# Patient Record
Sex: FEMALE | Race: WHITE | Employment: FULL TIME | URBAN - METROPOLITAN AREA
[De-identification: names, ages, dates, MRNs, and addresses within clinical notes are randomized per-mention and may not be internally consistent; named-entity substitution may affect disease eponyms.]

---

## 2019-06-01 ENCOUNTER — HOSPITAL ENCOUNTER (OUTPATIENT)
Age: 49
Discharge: HOME | End: 2019-06-01
Payer: COMMERCIAL

## 2019-06-01 PROCEDURE — 80061 LIPID PANEL: CPT

## 2019-06-01 PROCEDURE — 84443 ASSAY THYROID STIM HORMONE: CPT

## 2019-06-01 PROCEDURE — 80307 DRUG TEST PRSMV CHEM ANLYZR: CPT

## 2019-06-01 PROCEDURE — 80053 COMPREHEN METABOLIC PANEL: CPT

## 2019-06-01 PROCEDURE — 36415 COLL VENOUS BLD VENIPUNCTURE: CPT

## 2019-06-01 PROCEDURE — 85025 COMPLETE CBC W/AUTO DIFF WBC: CPT

## 2019-06-02 LAB
A/G RATIO: 1.7 (ref 1.1–2.2)
ALBUMIN SERPL-MCNC: 4.9 G/DL (ref 3.4–5)
ALP BLD-CCNC: 106 U/L (ref 40–129)
ALT SERPL-CCNC: 36 U/L (ref 10–40)
AMPHETAMINE SCREEN, URINE: NORMAL
ANION GAP SERPL CALCULATED.3IONS-SCNC: 13 MMOL/L (ref 3–16)
AST SERPL-CCNC: 30 U/L (ref 15–37)
BARBITURATE SCREEN URINE: NORMAL
BASOPHILS ABSOLUTE: 0.1 K/UL (ref 0–0.2)
BASOPHILS RELATIVE PERCENT: 1 %
BENZODIAZEPINE SCREEN, URINE: NORMAL
BILIRUB SERPL-MCNC: 0.5 MG/DL (ref 0–1)
BUN BLDV-MCNC: 12 MG/DL (ref 7–20)
CALCIUM SERPL-MCNC: 9.8 MG/DL (ref 8.3–10.6)
CANNABINOID SCREEN URINE: NORMAL
CHLORIDE BLD-SCNC: 104 MMOL/L (ref 99–110)
CHOLESTEROL, TOTAL: 206 MG/DL (ref 0–199)
CO2: 25 MMOL/L (ref 21–32)
COCAINE METABOLITE SCREEN URINE: NORMAL
CREAT SERPL-MCNC: 0.6 MG/DL (ref 0.6–1.1)
EOSINOPHILS ABSOLUTE: 0.1 K/UL (ref 0–0.6)
EOSINOPHILS RELATIVE PERCENT: 1.9 %
GFR AFRICAN AMERICAN: >60
GFR NON-AFRICAN AMERICAN: >60
GLOBULIN: 2.9 G/DL
GLUCOSE BLD-MCNC: 98 MG/DL (ref 70–99)
HCT VFR BLD CALC: 46.4 % (ref 36–48)
HDLC SERPL-MCNC: 122 MG/DL (ref 40–60)
HEMOGLOBIN: 16 G/DL (ref 12–16)
LDL CHOLESTEROL CALCULATED: 76 MG/DL
LYMPHOCYTES ABSOLUTE: 1.3 K/UL (ref 1–5.1)
LYMPHOCYTES RELATIVE PERCENT: 17.1 %
Lab: NORMAL
MCH RBC QN AUTO: 34.1 PG (ref 26–34)
MCHC RBC AUTO-ENTMCNC: 34.5 G/DL (ref 31–36)
MCV RBC AUTO: 99 FL (ref 80–100)
METHADONE SCREEN, URINE: NORMAL
MONOCYTES ABSOLUTE: 0.6 K/UL (ref 0–1.3)
MONOCYTES RELATIVE PERCENT: 7.6 %
NEUTROPHILS ABSOLUTE: 5.5 K/UL (ref 1.7–7.7)
NEUTROPHILS RELATIVE PERCENT: 72.4 %
OPIATE SCREEN URINE: NORMAL
OXYCODONE URINE: NORMAL
PDW BLD-RTO: 13.7 % (ref 12.4–15.4)
PH UA: 5
PHENCYCLIDINE SCREEN URINE: NORMAL
PLATELET # BLD: 264 K/UL (ref 135–450)
PMV BLD AUTO: 9.3 FL (ref 5–10.5)
POTASSIUM SERPL-SCNC: 5.6 MMOL/L (ref 3.5–5.1)
PROPOXYPHENE SCREEN: NORMAL
RBC # BLD: 4.68 M/UL (ref 4–5.2)
SODIUM BLD-SCNC: 142 MMOL/L (ref 136–145)
TOTAL PROTEIN: 7.8 G/DL (ref 6.4–8.2)
TRIGL SERPL-MCNC: 41 MG/DL (ref 0–150)
TSH REFLEX FT4: 2.49 UIU/ML (ref 0.27–4.2)
VLDLC SERPL CALC-MCNC: 8 MG/DL
WBC # BLD: 7.6 K/UL (ref 4–11)

## 2020-07-02 ENCOUNTER — TELEPHONE (OUTPATIENT)
Dept: ADMINISTRATIVE | Age: 50
End: 2020-07-02

## 2020-07-07 ENCOUNTER — NURSE TRIAGE (OUTPATIENT)
Dept: OTHER | Facility: CLINIC | Age: 50
End: 2020-07-07

## 2020-07-07 ENCOUNTER — TELEPHONE (OUTPATIENT)
Dept: ADMINISTRATIVE | Age: 50
End: 2020-07-07

## 2020-07-07 NOTE — TELEPHONE ENCOUNTER
Reason for Disposition   Numbness or tingling in one or both hands is a chronic symptom (recurrent or ongoing problem lasting > 4 weeks)    Answer Assessment - Initial Assessment Questions  1. SYMPTOM: \"What is the main symptom you are concerned about? \" (e.g., weakness, numbness)      Numbness and tingling in arms and feet  2. ONSET: \"When did this start? \" (minutes, hours, days; while sleeping)      2-3 weeks  3. LAST NORMAL: \"When was the last time you were normal (no symptoms)? \"      2-3 weeks ago  4. PATTERN \"Does this come and go, or has it been constant since it started? \"  \"Is it present now? \"      constant  5. CARDIAC SYMPTOMS: \"Have you had any of the following symptoms: chest pain, difficulty breathing, palpitations? \"      no  6. NEUROLOGIC SYMPTOMS: \"Have you had any of the following symptoms: headache, dizziness, vision loss, double vision, changes in speech, unsteady on your feet? \"      no  7. OTHER SYMPTOMS: \"Do you have any other symptoms? \"      no  8. PREGNANCY: \"Is there any chance you are pregnant? \" \"When was your last menstrual period? \"      no    Protocols used: NEUROLOGIC DEFICIT-ADULT-[place]

## 2020-07-13 ENCOUNTER — TELEPHONE (OUTPATIENT)
Dept: INTERNAL MEDICINE CLINIC | Age: 50
End: 2020-07-13

## 2020-07-13 ENCOUNTER — OFFICE VISIT (OUTPATIENT)
Dept: INTERNAL MEDICINE CLINIC | Age: 50
End: 2020-07-13
Payer: COMMERCIAL

## 2020-07-13 VITALS
DIASTOLIC BLOOD PRESSURE: 72 MMHG | BODY MASS INDEX: 26.16 KG/M2 | TEMPERATURE: 98.1 F | HEART RATE: 101 BPM | WEIGHT: 157 LBS | HEIGHT: 65 IN | OXYGEN SATURATION: 98 % | SYSTOLIC BLOOD PRESSURE: 130 MMHG

## 2020-07-13 PROBLEM — J43.8 OTHER EMPHYSEMA (HCC): Status: ACTIVE | Noted: 2020-07-13

## 2020-07-13 PROBLEM — F31.61 BIPOLAR 1 DISORDER, MIXED, MILD (HCC): Status: ACTIVE | Noted: 2020-07-13

## 2020-07-13 PROBLEM — Z79.899 CONTROLLED SUBSTANCE AGREEMENT SIGNED: Status: ACTIVE | Noted: 2020-07-13

## 2020-07-13 PROBLEM — F98.8 ATTENTION DEFICIT DISORDER (ADD) WITHOUT HYPERACTIVITY: Status: ACTIVE | Noted: 2020-07-13

## 2020-07-13 PROCEDURE — 99204 OFFICE O/P NEW MOD 45 MIN: CPT | Performed by: INTERNAL MEDICINE

## 2020-07-13 PROCEDURE — G8926 SPIRO NO PERF OR DOC: HCPCS | Performed by: INTERNAL MEDICINE

## 2020-07-13 PROCEDURE — G8427 DOCREV CUR MEDS BY ELIG CLIN: HCPCS | Performed by: INTERNAL MEDICINE

## 2020-07-13 PROCEDURE — 90715 TDAP VACCINE 7 YRS/> IM: CPT | Performed by: INTERNAL MEDICINE

## 2020-07-13 PROCEDURE — 90471 IMMUNIZATION ADMIN: CPT | Performed by: INTERNAL MEDICINE

## 2020-07-13 PROCEDURE — G8419 CALC BMI OUT NRM PARAM NOF/U: HCPCS | Performed by: INTERNAL MEDICINE

## 2020-07-13 PROCEDURE — 99386 PREV VISIT NEW AGE 40-64: CPT | Performed by: INTERNAL MEDICINE

## 2020-07-13 PROCEDURE — 3023F SPIROM DOC REV: CPT | Performed by: INTERNAL MEDICINE

## 2020-07-13 PROCEDURE — 4004F PT TOBACCO SCREEN RCVD TLK: CPT | Performed by: INTERNAL MEDICINE

## 2020-07-13 RX ORDER — GABAPENTIN 300 MG/1
300 CAPSULE ORAL 3 TIMES DAILY
Qty: 90 CAPSULE | Refills: 0 | Status: SHIPPED | OUTPATIENT
Start: 2020-07-13 | End: 2020-08-11 | Stop reason: SDUPTHER

## 2020-07-13 RX ORDER — BUDESONIDE AND FORMOTEROL FUMARATE DIHYDRATE 80; 4.5 UG/1; UG/1
2 AEROSOL RESPIRATORY (INHALATION) DAILY
COMMUNITY
End: 2020-07-13 | Stop reason: DRUGHIGH

## 2020-07-13 RX ORDER — DEXTROAMPHETAMINE SACCHARATE, AMPHETAMINE ASPARTATE, DEXTROAMPHETAMINE SULFATE AND AMPHETAMINE SULFATE 3.75; 3.75; 3.75; 3.75 MG/1; MG/1; MG/1; MG/1
15 TABLET ORAL DAILY PRN
Qty: 30 TABLET | Refills: 0 | Status: SHIPPED | OUTPATIENT
Start: 2020-07-13 | End: 2020-07-13 | Stop reason: SDUPTHER

## 2020-07-13 RX ORDER — DEXTROAMPHETAMINE SACCHARATE, AMPHETAMINE ASPARTATE, DEXTROAMPHETAMINE SULFATE AND AMPHETAMINE SULFATE 3.75; 3.75; 3.75; 3.75 MG/1; MG/1; MG/1; MG/1
15 TABLET ORAL DAILY PRN
Qty: 30 TABLET | Refills: 0 | Status: SHIPPED | OUTPATIENT
Start: 2020-07-13 | End: 2020-08-11 | Stop reason: SDUPTHER

## 2020-07-13 RX ORDER — CITALOPRAM 40 MG/1
40 TABLET ORAL DAILY
Qty: 90 TABLET | Refills: 0 | COMMUNITY
Start: 2020-07-13 | End: 2020-09-29 | Stop reason: SDUPTHER

## 2020-07-13 RX ORDER — BUDESONIDE AND FORMOTEROL FUMARATE DIHYDRATE 160; 4.5 UG/1; UG/1
2 AEROSOL RESPIRATORY (INHALATION) 2 TIMES DAILY
Qty: 1 INHALER | Refills: 0 | Status: SHIPPED | OUTPATIENT
Start: 2020-07-13 | End: 2020-08-11 | Stop reason: SDUPTHER

## 2020-07-13 RX ORDER — DEXTROAMPHETAMINE SACCHARATE, AMPHETAMINE ASPARTATE, DEXTROAMPHETAMINE SULFATE AND AMPHETAMINE SULFATE 3.75; 3.75; 3.75; 3.75 MG/1; MG/1; MG/1; MG/1
15 TABLET ORAL 2 TIMES DAILY
COMMUNITY
End: 2020-07-13 | Stop reason: SDUPTHER

## 2020-07-13 ASSESSMENT — PATIENT HEALTH QUESTIONNAIRE - PHQ9
SUM OF ALL RESPONSES TO PHQ QUESTIONS 1-9: 2
2. FEELING DOWN, DEPRESSED OR HOPELESS: 1
1. LITTLE INTEREST OR PLEASURE IN DOING THINGS: 1
SUM OF ALL RESPONSES TO PHQ QUESTIONS 1-9: 2
SUM OF ALL RESPONSES TO PHQ9 QUESTIONS 1 & 2: 2

## 2020-07-13 NOTE — LETTER
MEDICATION AGREEMENT     [facility]  4/5/3706      For certain conditions, multiple classes of medications may be used to help better manage your symptoms, and to improve your ability to function at home, work and in social settings. However, these medications do have risks, which will be discussed with you, including addiction and dependency. The following prescribed medications need frequent monitoring and will require you to partner and assist in your healthcare. Medication  Dose, instructions and quantity as indicated on current prescription bottle Diagnosis/Reason(s) for Taking Category     Adderall 15 mg bid prn ADD                            Benefits and goals of Controlled Substance Medications: There are two potential goals for your treatment: (1) decreased pain and suffering (2) improved daily life functions. There are many possible treatments for your chronic condition(s), and, in addition to controlled substance medications, we will try alternatives such as physical therapy, yoga, massage, home daily exercise, meditation, relaxation techniques, injections, chiropractic manipulations, surgery, cognitive therapy, hypnosis and many medications that are not habit-forming. Use of controlled substance medications may be helpful, but they are unlikely to resolve all of your symptoms or restore all function. Risks of Controlled Substance Medications:    Opioid pain medications: These medications can lead to problems such as addiction/dependence, sedation, lightheadedness/dizziness, memory issues, falls, constipation, nausea, or vomiting. They may also impair the ability to drive or operate machinery. Additionally, these medications may lower testosterone levels, leading to loss of bone strength, stamina and sex drive. They may cause problems with breathing, sleep apnea and reduced coughing, which are especially dangerous for patients with lung disease.   Overdose or dangerous interactions with alcohol and other medications may occur, leading to death. Hyperalgesia may develop, in which patients receiving opioids for the treatment of pain may actually become more sensitive to certain painful stimuli, and in some cases, experience pain from ordinarily non-painful stimuli. Women between the ages of 14-53 who could become pregnant should carefully weigh the risks and benefits of opioids with their physicians, as these medications increase the risk of pregnancy complications, including miscarriage,  delivery and stillbirth. It is also possible for babies to be born addicted to opioids. Opioid dependence withdrawal symptoms may include; feelings of uneasiness, increased pain, irritability, belly pain, diarrhea, sweats and goose-flesh. Benzodiazepines and non-benzodiazepine sleep medications: These medications can lead to problems such as addiction/dependence, sedation, fatigue, lightheadedness, dizziness, incoordination, falls, depression, hallucinations, and impaired judgment, memory and concentration. The ability to drive and operate machinery may also be affected. Abnormal sleep-related behaviors have been reported, including sleep walking, driving, making telephone calls, eating, or having sex while not fully awake. These medications can suppress breathing and worsen sleep apnea, particularly when combined with alcohol or other sedating medications, potentially leading to death. Dependence withdrawal symptoms may include tremors, anxiety, hallucinations and seizures. Stimulants:  Common adverse effects include addiction/dependence, increased blood pressure and heart rate, decreased appetite, nausea, involuntary weight loss, insomnia, irritability, and headaches. These risks may increase when these medications are combined with other stimulants, such as caffeine pills or energy drinks, certain weight loss supplements and oral decongestants.   Dependence withdrawal symptoms may include depressed mood, loss of interest, suicidal thoughts, anxiety, fatigue, appetite changes and agitation. Testosterone replacement therapy:  Potential side effects include increased risk of stroke and heart attack, blood clots, increased blood pressure, increased cholesterol, enlarged prostate, sleep apnea, irritability/aggression and other mood disorders, and decreased fertility. Other:     1. I understand that I have the following responsibilities:  · I will take medications at the dose and frequency prescribed. · I will not increase or change how I take my medications without the approval of the health care provider who signs this Medication Agreement. · I will arrange for refills at the prescribed interval ONLY during regular office hours. I will not ask for refills earlier than agreed, after-hours, on holidays or on weekends. · I will obtain all refills for these medications at  ·  ____________________________________  pharmacy (phone number  ·  ________________________), with full consent for my provider and pharmacist to exchange information in writing or verbally. · I will not request any pain medications or controlled substances from other providers and will inform this provider of all other medications I am taking. · I will inform my other health care providers that I am taking these medications and of the existence of this [id number]. In the event of an emergency, I will provide the same information to the emergency department providers. · I will protect my prescriptions and medications. I understand that lost or misplaced prescriptions will not be replaced. · I will keep medications only for my own use and will not share them with others. I will keep all medications away from children. · I agree to participate in any medical, psychological or psychiatric assessments recommended by my provider.   · I will actively participate in any program designed to improve

## 2020-07-13 NOTE — TELEPHONE ENCOUNTER
Patient called in & was informed by Jim that they are not in network with [facility], all of her prescriptions were transferred to [facility] except Adderall. We will need a new prescription sent to the [address]. I will call & cancel the one sent to [place].

## 2020-07-13 NOTE — PROGRESS NOTES
[facility]  History and Physical  Kavon Hernandez M.D. Nicole Garcia  YOB: 1970    Date of Service:  7/13/2020    Chief Complaint:   Nicole Garcia is a 52 y.o. female who presents for   Chief Complaint   Patient presents with   [facility]       HPI: Here for Annual Physical and Follow up. She complains of numbness in her feet 2 months ago and then bilateral hand numbness. She lost her job 2 months ago and now babysitting 2 grandkids 2 and 3 y/o and she's on her feet a lot chasing them around. Her grandkids live with her every other week and stay with their dad the other time. ADD:  She's been out of her Adderal 15 mg bid since July 4 th and a little scattered brain but doing ok since just watching her grandkids and no longer working. Bipolar with depression due to stress of taking care of her grandkids and her daughter stealing from her for drugs. She's been on Vraylar 1.5 mg qd. She was seeing a counselor until she lost her job and insurance.     Emphysema:  Have cut back on TOB to 1/2 ppd recently and doing ok on Symbicort 80 bid    No results found for: LABA1C, LABMICR  Lab Results   Component Value Date     06/01/2019    K 5.6 (H) 06/01/2019     06/01/2019    CO2 25 06/01/2019    BUN 12 06/01/2019    CREATININE 0.6 06/01/2019    GLUCOSE 98 06/01/2019    CALCIUM 9.8 06/01/2019     Lab Results   Component Value Date    CHOL 206 06/01/2019    TRIG 41 06/01/2019     06/01/2019    LDLCALC 76 06/01/2019     Lab Results   Component Value Date    ALT 36 06/01/2019    AST 30 06/01/2019     No results found for: TSH, T4FREE  Lab Results   Component Value Date    WBC 7.6 06/01/2019    HGB 16.0 06/01/2019    HCT 46.4 06/01/2019    MCV 99.0 06/01/2019     06/01/2019     No results found for: INR   No results found for: PSA   No results found for: LABURIC     Wt Readings from Last 3 Encounters:   07/13/20 157 lb (71.2 kg)     BP Readings from Last 3 Encounters:   07/13/20 130/72       There is no problem list on file for this patient. No Known Allergies  Outpatient Medications Marked as Taking for the 7/13/20 encounter (Office Visit) with Sandra Jarrett MD   Medication Sig Dispense Refill    cariprazine hcl (VRAYLAR) 1.5 MG capsule Take 1.5 mg by mouth daily      amphetamine-dextroamphetamine (ADDERALL) 15 MG tablet Take 15 mg by mouth 2 times daily.  budesonide-formoterol (SYMBICORT) 80-4.5 MCG/ACT AERO Inhale 2 puffs into the lungs daily         Past Medical History:   Diagnosis Date    ADHD (attention deficit hyperactivity disorder)     Anxiety     Bipolar disorder (HCC)     COPD (chronic obstructive pulmonary disease) ([facility] [address]     Depression      History reviewed. No pertinent surgical history.   Family History   Problem Relation Age of Onset    Breast Cancer Mother     High Blood Pressure Mother    [facility] Cancer Father         esophageal    ADHD Sister     Bipolar Disorder Brother     Mental Illness Brother     Heart Disease Maternal Grandmother     Heart Attack Maternal Grandmother 48    Cancer Maternal Grandfather         throat    Alcohol Abuse Maternal Grandfather     Prostate Cancer Maternal Uncle      Social History     Socioeconomic History    Marital status: Single     Spouse name: Not on file    Number of children: Not on file    Years of education: Not on file    Highest education level: Not on file   Occupational History    Not on file   Social Needs    Financial resource strain: Not on file    Food insecurity     Worry: Not on file     Inability: Not on file   [employer] needs     Medical: Not on file     Non-medical: Not on file   Tobacco Use    Smoking status: Current Every Day Smoker     Packs/day: 1.00     Years: 30.00     Pack years: 30.00     Types: Cigarettes    Smokeless tobacco: Never Used   Substance and Sexual Activity    Alcohol use: Yes     Comment: Occasionally    Drug use: Never    Sexual activity: Not Currently   Lifestyle    Physical activity     Days per week: Not on file     Minutes per session: Not on file    Stress: Not on file   Relationships    Social connections     Talks on phone: Not on file     Gets together: Not on file     Attends [employer] service: Not on file     Active member of club or organization: Not on file     Attends meetings of clubs or organizations: Not on file     Relationship status: Not on file    Intimate partner violence     Fear of current or ex partner: Not on file     Emotionally abused: Not on file     Physically abused: Not on file     Forced sexual activity: Not on file   Other Topics Concern    Not on file   Social History Narrative    Not on file       Review of Systems:  A comprehensive review of systems was negative except for what was noted in the HPI. Physical Exam:   Vitals:    07/13/20 1124   BP: 130/72   Pulse: 101   Temp: 98.1 °F (36.7 °C)   TempSrc: Infrared   SpO2: 98%   Weight: 157 lb (71.2 kg)   Height: 5' 5\" (1.651 m)     Body mass index is 26.13 kg/m². Constitutional: She is oriented to person, place, and time. She appears well-developed and well-nourished. No distress. HEENT:   Head: Normocephalic and atraumatic. Right Ear: Tympanic membrane, external ear and ear canal normal.   Left Ear: Tympanic membrane, external ear and ear canal normal.   Mouth/Throat: Oropharynx is clear and moist, and mucous membranes are normal.  There is no cervical adenopathy. Eyes: Conjunctivae and extraocular motions are normal. Pupils are equal, round, and reactive to light. Neck: Supple. No JVD present. Carotid bruit is not present. No mass and no thyromegaly present. Cardiovascular: Normal rate, regular rhythm, normal heart sounds and intact distal pulses. Exam reveals no gallop and no friction rub. No murmur heard. Pulmonary/Chest: Effort normal and breath sounds normal. No respiratory distress. She has no wheezes, rhonchi or rales. Abdominal: Soft, non-tender. Bowel sounds and aorta are normal. She exhibits no organomegaly, mass or bruit. Genitourinary: performed by gynecologist   Breast exam:  performed by specialist.  Musculoskeletal: Normal range of motion, no synovitis. She exhibits no edema. Neurological: She is alert and oriented to person, place, and time. She has normal reflexes. No cranial nerve deficit. Coordination normal.   Skin: Skin is warm and dry. There is no rash or erythema. No suspicious lesions noted. Psychiatric: She has a normal mood and affect. Her speech is normal and behavior is normal. Judgment, cognition and memory are normal.   positve phalans, negative tinels bilaterally. Numbness in bilateral plantar. No lesions. Preventive Care:  Health Maintenance   Topic Date Due    HIV screen  09/02/1985    DTaP/Tdap/Td vaccine (1 - Tdap) 09/02/1989    Breast cancer screen  02/27/2016    Cervical cancer screen  07/13/2021 (Originally 9/2/1991)    Flu vaccine (1) 09/01/2020    Lipid screen  06/01/2024    Hepatitis A vaccine  Aged Out    Hepatitis B vaccine  Aged Out    Hib vaccine  Aged Out    Meningococcal (ACWY) vaccine  Aged Out    Pneumococcal 0-64 years Vaccine  Aged Out            Assessment/Plan:    Shey Benites was seen today for establish care. Diagnoses and all orders for this visit:    Annual physical exam  -     Lipid Panel; Future  -     Comprehensive Metabolic Panel; Future  -     CBC with Differential; Future    Need for tetanus, diphtheria, and acellular pertussis (Tdap) vaccine in patient of adolescent age or older  -     Tdap (age 6y and older) IM ([address])    Screening for HIV (human immunodeficiency virus)  -     HIV Screen; Future    Bipolar 1 disorder, mixed, mild (HCC)  Increase cariprazine hcl (VRAYLAR) 3 MG CAPS capsule; Take 1 capsule by mouth daily    Attention deficit disorder (ADD) without hyperactivity  Decrease amphetamine-dextroamphetamine (ADDERALL) 15 MG tablet;  Take 1 tablet by mouth daily as needed (ADD) for up to 30 days. Other emphysema (HCC)  Increase budesonide-formoterol (SYMBICORT) 160-4.5 MCG/ACT AERO; Inhale 2 puffs into the lungs 2 times daily COUPON $0 BIN# [id number]  N# [id number]  [facility]# [id number] ID#  [id number]    Controlled substance agreement signed  -     amphetamine-dextroamphetamine (ADDERALL) 15 MG tablet; Take 1 tablet by mouth daily as needed (ADD) for up to 30 days. Neuralgia and neuritis  Start gabapentin (NEURONTIN) 300 MG capsule; Take 1 capsule by mouth 3 times daily for 30 days. Controlled Substance Monitoring:    Acute and Chronic Pain Monitoring:   RX Monitoring 7/13/2020   Periodic Controlled Substance Monitoring Possible medication side effects, risk of tolerance/dependence & alternative treatments discussed. ;No signs of potential drug abuse or diversion identified. Return VV 1 month on bipolar, neuralgia, ADD, COPD.

## 2020-07-20 ENCOUNTER — TELEPHONE (OUTPATIENT)
Dept: INTERNAL MEDICINE CLINIC | Age: 50
End: 2020-07-20

## 2020-07-20 NOTE — TELEPHONE ENCOUNTER
No PA needed, PA rejection came from [facility] and patient picked up prescription 7/15/2020 through [facility].

## 2020-07-20 NOTE — TELEPHONE ENCOUNTER
Request could not be processed through ePA on [email or web address], must call and initiate PA over the phone

## 2020-07-28 ENCOUNTER — HOSPITAL ENCOUNTER (OUTPATIENT)
Age: 50
Discharge: HOME | End: 2020-07-28
Payer: COMMERCIAL

## 2020-07-28 LAB
A/G RATIO: 1.4 (ref 1.1–2.2)
ALBUMIN SERPL-MCNC: 4.3 G/DL (ref 3.4–5)
ALP BLD-CCNC: 102 U/L (ref 40–129)
ALT SERPL-CCNC: 39 U/L (ref 10–40)
ANION GAP SERPL CALCULATED.3IONS-SCNC: 18 MMOL/L (ref 3–16)
AST SERPL-CCNC: 27 U/L (ref 15–37)
BASOPHILS ABSOLUTE: 0.1 K/UL (ref 0–0.2)
BASOPHILS RELATIVE PERCENT: 0.9 %
BILIRUB SERPL-MCNC: 0.3 MG/DL (ref 0–1)
BUN BLDV-MCNC: 14 MG/DL (ref 7–20)
CALCIUM SERPL-MCNC: 9.7 MG/DL (ref 8.3–10.6)
CHLORIDE BLD-SCNC: 105 MMOL/L (ref 99–110)
CHOLESTEROL, TOTAL: 219 MG/DL (ref 0–199)
CO2: 22 MMOL/L (ref 21–32)
CREAT SERPL-MCNC: 0.7 MG/DL (ref 0.6–1.1)
EOSINOPHILS ABSOLUTE: 0.1 K/UL (ref 0–0.6)
EOSINOPHILS RELATIVE PERCENT: 2.2 %
GFR AFRICAN AMERICAN: >60
GFR NON-AFRICAN AMERICAN: >60
GLOBULIN: 3 G/DL
GLUCOSE BLD-MCNC: 97 MG/DL (ref 70–99)
HCT VFR BLD CALC: 41.7 % (ref 36–48)
HDLC SERPL-MCNC: 80 MG/DL (ref 40–60)
HEMOGLOBIN: 14.2 G/DL (ref 12–16)
LDL CHOLESTEROL CALCULATED: 122 MG/DL
LYMPHOCYTES ABSOLUTE: 1.9 K/UL (ref 1–5.1)
LYMPHOCYTES RELATIVE PERCENT: 29.4 %
MCH RBC QN AUTO: 31.3 PG (ref 26–34)
MCHC RBC AUTO-ENTMCNC: 34.1 G/DL (ref 31–36)
MCV RBC AUTO: 91.8 FL (ref 80–100)
MONOCYTES ABSOLUTE: 0.4 K/UL (ref 0–1.3)
MONOCYTES RELATIVE PERCENT: 5.7 %
NEUTROPHILS ABSOLUTE: 4 K/UL (ref 1.7–7.7)
NEUTROPHILS RELATIVE PERCENT: 61.8 %
PDW BLD-RTO: 13.1 % (ref 12.4–15.4)
PLATELET # BLD: 238 K/UL (ref 135–450)
PMV BLD AUTO: 9 FL (ref 5–10.5)
POTASSIUM SERPL-SCNC: 4.4 MMOL/L (ref 3.5–5.1)
RBC # BLD: 4.54 M/UL (ref 4–5.2)
SODIUM BLD-SCNC: 145 MMOL/L (ref 136–145)
TOTAL PROTEIN: 7.3 G/DL (ref 6.4–8.2)
TRIGL SERPL-MCNC: 83 MG/DL (ref 0–150)
VLDLC SERPL CALC-MCNC: 17 MG/DL
WBC # BLD: 6.5 K/UL (ref 4–11)

## 2020-07-28 PROCEDURE — 86701 HIV-1ANTIBODY: CPT

## 2020-07-28 PROCEDURE — 87390 HIV-1 AG IA: CPT

## 2020-07-28 PROCEDURE — 36415 COLL VENOUS BLD VENIPUNCTURE: CPT

## 2020-07-28 PROCEDURE — 85025 COMPLETE CBC W/AUTO DIFF WBC: CPT

## 2020-07-28 PROCEDURE — 80061 LIPID PANEL: CPT

## 2020-07-28 PROCEDURE — 80053 COMPREHEN METABOLIC PANEL: CPT

## 2020-07-28 PROCEDURE — 86702 HIV-2 ANTIBODY: CPT

## 2020-07-29 LAB
HIV AG/AB: NORMAL
HIV ANTIGEN: NORMAL
HIV-1 ANTIBODY: NORMAL
HIV-2 AB: NORMAL

## 2020-08-11 ENCOUNTER — TELEMEDICINE (OUTPATIENT)
Dept: INTERNAL MEDICINE CLINIC | Age: 50
End: 2020-08-11
Payer: COMMERCIAL

## 2020-08-11 PROCEDURE — 99214 OFFICE O/P EST MOD 30 MIN: CPT | Performed by: INTERNAL MEDICINE

## 2020-08-11 PROCEDURE — G8427 DOCREV CUR MEDS BY ELIG CLIN: HCPCS | Performed by: INTERNAL MEDICINE

## 2020-08-11 RX ORDER — DEXTROAMPHETAMINE SACCHARATE, AMPHETAMINE ASPARTATE, DEXTROAMPHETAMINE SULFATE AND AMPHETAMINE SULFATE 3.75; 3.75; 3.75; 3.75 MG/1; MG/1; MG/1; MG/1
15 TABLET ORAL DAILY
Qty: 30 TABLET | Refills: 0 | Status: SHIPPED | OUTPATIENT
Start: 2020-08-11 | End: 2020-09-01 | Stop reason: SDUPTHER

## 2020-08-11 RX ORDER — GABAPENTIN 300 MG/1
CAPSULE ORAL
Qty: 90 CAPSULE | Refills: 0 | Status: SHIPPED | OUTPATIENT
Start: 2020-08-11 | End: 2020-09-01 | Stop reason: SDUPTHER

## 2020-08-11 RX ORDER — BUDESONIDE AND FORMOTEROL FUMARATE DIHYDRATE 160; 4.5 UG/1; UG/1
2 AEROSOL RESPIRATORY (INHALATION) 2 TIMES DAILY
Qty: 3 INHALER | Refills: 3 | Status: SHIPPED
Start: 2020-08-11 | End: 2021-04-13 | Stop reason: CLARIF

## 2020-08-11 NOTE — PROGRESS NOTES
Date of Service:  8/11/2020    Chief Complaint:      Chief Complaint   Patient presents with    Manic Behavior    ADHD    COPD       HPI:  Aislinn Deng is a 52 y.o. Pursuant to the emergency declaration under the [facility] [place] [address] waiver authority and the [employer] Act, this Virtual  Video Visit was conducted, with patient's consent, to reduce the patient's risk of exposure to COVID-19 and provide continuity of care. Service is  provided through a video synchronous discussion virtually to substitute for in-person clinic visit with the patient being at home and Dr. Josie Alston being at home. ADD:  She's been out of her Adderal 15 mg qd about 6-7 days a week. Doing ok daily since just watching her grandkids and no longer working.     Bipolar with depression:  Improved but still depress with crying everyday due to stress of taking care of her grandkids and her daughter stealing from her for drugs. She was seeing a counselor until she lost her job and insurance.     Emphysema:  Stable on Symbicort 160/4.5 2 puff bid. Have cut back on TOB to < 1/2 ppd recently.   Neuralgia in hands and feet:  Improved pain but still pain daily on Neurontin 300 mg bid and can't increase tid due to sedation    No results found for: LABA1C, LABMICR  Lab Results   Component Value Date     07/28/2020    K 4.4 07/28/2020     07/28/2020    CO2 22 07/28/2020    BUN 14 07/28/2020    CREATININE 0.7 07/28/2020    GLUCOSE 97 07/28/2020    CALCIUM 9.7 07/28/2020     Lab Results   Component Value Date    CHOL 219 07/28/2020    TRIG 83 07/28/2020    HDL 80 07/28/2020    LDLCALC 122 07/28/2020     Lab Results   Component Value Date    ALT 39 07/28/2020    AST 27 07/28/2020     No results found for: TSH, T4FREE  Lab Results   Component Value Date    WBC 6.5 07/28/2020    HGB 14.2 07/28/2020    HCT 41.7 07/28/2020    MCV 91.8 07/28/2020    PLT 238 07/28/2020     No results found for: INR   No results found for: PSA   No results found for: [facility]     Patient Active Problem List   Diagnosis    Bipolar 1 disorder, mixed, mild ([facility] [address]    Attention deficit disorder (ADD) without hyperactivity    Other emphysema ([facility] [address]    Controlled substance agreement signed       No Known Allergies  Outpatient Medications Marked as Taking for the 8/11/20 encounter (Telemedicine) with Ryan Jarrett MD   Medication Sig Dispense Refill    budesonide-formoterol (SYMBICORT) 160-4.5 MCG/ACT AERO Inhale 2 puffs into the lungs 2 times daily COUPON $0 BIN# [id number]  PCN# CN  [facility]# [id number] ID#  [id number] 1 Inhaler 0    cariprazine hcl (VRAYLAR) 3 MG CAPS capsule Take 1 capsule by mouth daily 30 capsule 0    citalopram (CELEXA) 40 MG tablet Take 1 tablet by mouth daily 90 tablet 0    gabapentin (NEURONTIN) 300 MG capsule Take 1 capsule by mouth 3 times daily for 30 days. 90 capsule 0    amphetamine-dextroamphetamine (ADDERALL) 15 MG tablet Take 1 tablet by mouth daily as needed (ADD) for up to 30 days. 30 tablet 0         Review of Systems: 14 systems were negative except of what was stated on HPI    Nursing note and vitals reviewed. There were no vitals filed for this visit. Wt Readings from Last 3 Encounters:   07/13/20 157 lb (71.2 kg)     BP Readings from Last 3 Encounters:   07/13/20 130/72     There is no height or weight on file to calculate BMI. Constitutional: Patient appears well-developed and well-nourished. No distress. Head: Normocephalic and atraumatic. Skin: No rash or erythema. Psychiatric: Normal mood and affect. Behavior is normal.       Assessment/Plan:  Brion Oppenheim was seen today for manic behavior, adhd and copd. Diagnoses and all orders for this visit:    Bipolar 1 disorder, mixed, mild (HCC)  Increase cariprazine hcl (VRAYLAR) 4.5 MG CAPS capsule;  Take 4.5 mg by mouth daily    Attention deficit disorder (ADD) without hyperactivity  - amphetamine-dextroamphetamine (ADDERALL) 15 MG tablet; Take 1 tablet by mouth daily for 30 days. Controlled substance agreement signed  -     amphetamine-dextroamphetamine (ADDERALL) 15 MG tablet; Take 1 tablet by mouth daily for 30 days. Neuralgia and neuritis  Increase  gabapentin (NEURONTIN) 300 MG capsule; 1 AM and 2 PM    Other emphysema (HCC)  -     budesonide-formoterol (SYMBICORT) 160-4.5 MCG/ACT AERO; Inhale 2 puffs into the lungs 2 times daily COUPON $0 BIN# [id number]  PCN# CN  [facility]# [id number] ID#  [id number]        Return Sept 8 at 8:40 ADD, Bipolar, neuralgia.

## 2020-08-18 ENCOUNTER — TELEPHONE (OUTPATIENT)
Dept: INTERNAL MEDICINE CLINIC | Age: 50
End: 2020-08-18

## 2020-08-18 NOTE — TELEPHONE ENCOUNTER
Incoming call from pharmacy asking if albuterol (rescue inhaler) to be prescribed for patient. Symbicort inhaler on file but no rescue inhaler.     Last visit 8/11/2020 VV  Next visit 9/8/2020

## 2020-08-20 ENCOUNTER — TELEPHONE (OUTPATIENT)
Dept: INTERNAL MEDICINE CLINIC | Age: 50
End: 2020-08-20

## 2020-08-20 NOTE — TELEPHONE ENCOUNTER
Pt insurance is wanting an Albuterol Sulfate rescue inhaler prescribed and sent to the pharmacy. pls advise.

## 2020-08-24 NOTE — TELEPHONE ENCOUNTER
Please ignore request for inhaler on this encounter, this is an existing encounter for a Prior Auth request for medication. There is a separate encounter for inhaler.

## 2020-08-31 ENCOUNTER — TELEPHONE (OUTPATIENT)
Dept: INTERNAL MEDICINE CLINIC | Age: 50
End: 2020-08-31

## 2020-08-31 NOTE — TELEPHONE ENCOUNTER
----- Message from Candy Lowery sent at 8/31/2020 11:24 AM EDT -----  Subject: Message to Provider    QUESTIONS  Information for Provider? PT has new concern regarding medication. Neurontin is not helping with neuropathy and Abhijeet Rubio is putting her in a   dark place/triggering depression. Would like to see  this week   virtual visit is ok.  ---------------------------------------------------------------------------  --------------  CALL BACK INFO  What is the best way for the office to contact you? OK to leave message on   voicemail   OK to respond with secure message via [employer] portal (only for patients   who have registered [employer] account)  Preferred Call Back Phone Number? [phone]  ---------------------------------------------------------------------------  --------------  SCRIPT ANSWERS  Relationship to Patient? Self  Appointment reason? Well Care/Follow Ups  Select a Well Care/Follow Ups appointment reason? Adult Existing Condition   Follow Up [Diabetes   CHF   COPD   Hypertension/Blood Pressure Check]  (Is the patient requesting to be seen urgently for their symptoms?)? No  Is this follow up request related to routine Diabetes Management? No  Are you having any new concerns about your existing condition?  Yes

## 2020-09-01 ENCOUNTER — TELEMEDICINE (OUTPATIENT)
Dept: INTERNAL MEDICINE CLINIC | Age: 50
End: 2020-09-01
Payer: COMMERCIAL

## 2020-09-01 PROCEDURE — G8427 DOCREV CUR MEDS BY ELIG CLIN: HCPCS | Performed by: INTERNAL MEDICINE

## 2020-09-01 PROCEDURE — 99214 OFFICE O/P EST MOD 30 MIN: CPT | Performed by: INTERNAL MEDICINE

## 2020-09-01 RX ORDER — MELOXICAM 15 MG/1
15 TABLET ORAL DAILY
Qty: 30 TABLET | Refills: 0 | Status: SHIPPED | OUTPATIENT
Start: 2020-09-01 | End: 2020-09-29 | Stop reason: SDUPTHER

## 2020-09-01 RX ORDER — GABAPENTIN 300 MG/1
CAPSULE ORAL
Qty: 90 CAPSULE | Refills: 0 | Status: SHIPPED | OUTPATIENT
Start: 2020-09-01 | End: 2020-09-29 | Stop reason: SDUPTHER

## 2020-09-01 RX ORDER — DEXTROAMPHETAMINE SACCHARATE, AMPHETAMINE ASPARTATE, DEXTROAMPHETAMINE SULFATE AND AMPHETAMINE SULFATE 3.75; 3.75; 3.75; 3.75 MG/1; MG/1; MG/1; MG/1
15 TABLET ORAL DAILY
Qty: 30 TABLET | Refills: 0 | Status: SHIPPED | OUTPATIENT
Start: 2020-09-11 | End: 2020-09-29 | Stop reason: SDUPTHER
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
## 2020-09-03 ENCOUNTER — OFFICE VISIT (OUTPATIENT)
Dept: ORTHOPEDIC SURGERY | Age: 50
End: 2020-09-03
Payer: COMMERCIAL

## 2020-09-03 VITALS — BODY MASS INDEX: 26.16 KG/M2 | HEIGHT: 65 IN | WEIGHT: 157 LBS

## 2020-09-03 PROCEDURE — 99243 OFF/OP CNSLTJ NEW/EST LOW 30: CPT | Performed by: ORTHOPAEDIC SURGERY

## 2020-09-03 PROCEDURE — G8427 DOCREV CUR MEDS BY ELIG CLIN: HCPCS | Performed by: ORTHOPAEDIC SURGERY

## 2020-09-03 PROCEDURE — G8419 CALC BMI OUT NRM PARAM NOF/U: HCPCS | Performed by: ORTHOPAEDIC SURGERY

## 2020-09-03 NOTE — PROGRESS NOTES
Chief Complaint   Patient presents with    New Patient     left knee: no injury, pain started back up in june, most of the pain lateral knee, popping and clicking sometimes, no numbness/tingling, increase with ROM, has instbaility, no locking, swelling on and off        [address] is a 48 y.o. female. Presents for evaluation of her left knee. I am seeing her as a consultation at the request of Dr. Klaus Arevalo who referred her to our practice for an orthopedic specialty evaluation. About 2 months ago she began having some pain in her knee in the anterolateral aspect of her knee without any inciting injury. No prior major knee injuries, surgeries, injections. She has been taking meloxicam and has had some improvement in the swelling that she was noting, but still has some soreness and swelling. She indicates the lateral and anterolateral aspects of her knee most the pain. Activities/occupation: Not currently working    PAST MEDICAL/SURGICAL HISTORY     Past Medical History:   Diagnosis Date    ADHD (attention deficit hyperactivity disorder)     Anxiety     [facility] [address]     COPD (chronic obstructive pulmonary disease) ([facility] [address]     Depression        No past surgical history on file.     Social History     Socioeconomic History    Marital status: Single     Spouse name: Not on file    Number of children: Not on file    Years of education: Not on file    Highest education level: Not on file   Occupational History    Not on file   Social Needs    Financial resource strain: Not on file    Food insecurity     Worry: Not on file     Inability: Not on file    Transportation needs     Medical: Not on file     Non-medical: Not on file   Tobacco Use    Smoking status: Current Every Day Smoker     Packs/day: 1.00     Years: 30.00     Pack years: 30.00     Types: Cigarettes    Smokeless tobacco: Never Used   Substance and Sexual Activity    Alcohol use: Yes     Comment: Occasionally    Drug use: Never    Sexual activity: Not Currently   Lifestyle    Physical activity     Days per week: Not on file     Minutes per session: Not on file    Stress: Not on file   Relationships    Social connections     Talks on phone: Not on file     Gets together: Not on file     Attends Jewish service: Not on file     Active member of club or organization: Not on file     Attends meetings of clubs or organizations: Not on file     Relationship status: Not on file    Intimate partner violence     Fear of current or ex partner: Not on file     Emotionally abused: Not on file     Physically abused: Not on file     Forced sexual activity: Not on file   Other Topics Concern    Not on file   Social History Narrative    Not on file       Family History   Problem Relation Age of Onset    Breast Cancer Mother     High Blood Pressure Mother     Cancer Father         esophageal    ADHD Sister     Bipolar Disorder Brother     Mental Illness Brother     Heart Disease Maternal Grandmother     Heart Attack Maternal Grandmother 48    Cancer Maternal Grandfather         throat    Alcohol Abuse Maternal Grandfather     Prostate Cancer Maternal Uncle           REVIEW OF SYSTEMS  Pertinent items are noted in HPI  Review of systems reviewed from Patient History Form dated on 9/3/2020 and available in the patient's chart under the Media tab. PHYSICAL EXAM    Vitals:    09/03/20 1406   Weight: 157 lb (71.2 kg)   Height: 5' 5\" (1.651 m)       General Exam:   Constitutional: Patient is adequately groomed with no evidence of malnutrition  Mental Status: The patient is oriented to time, place and person. The patient's mood and affect are appropriate. Lymphatic: The lymphatic examination bilaterally reveals all areas to be without enlargement or induration. Neurological: The patient has good coordination. There is no weakness or sensory deficit.     Antalgic gait:  Yes    Bilateral lower extremities are neurovascularly intact with symmetric light touch sensation and distal pulses. Right Knee Exam:  No skin lesions, erythema, or warmth. No joint effusion. No joint line tenderness. Negative Suellen. Ligaments stable. Quad tone good. ROM 0-140    Left Knee Exam:  No skin lesions, erythema, or warmth. Effusion: 1+/4  Range Of Motion: 0-140    Hyperextension Pain:    negative  Hyperflexion Pain:     positive  Suellen:      positive  Medial Joint Line Tenderness:   Mild  Lateral Joint Line Tenderness: positive    Anterior Drawer:   negative  Posterior Drawer:   negative  Lachman:   negative  Pivot Shift:  not done  Valgus Stress:   negative  Varus Stress:   negative      REVIEW OF IMAGING  4 Views AP/PA 45 degree/Lateral/Sunrise of the left knee dated 9/3/2020 demonstrate no acute fracture. No dislocation. Minimal degenerative changes with normal alignment and no visible bony lesions or loose bodies. MRI available for review today: no      ASSESSMENT  25-year-old female with left knee pain, concern for meniscus tear, possible lateral meniscus tear      PLAN  -Diagnosis and treatment options discussed in detail today  -Given her lack of improvement for 2 months of medications, ice and activity modification and continued swelling and occasional clicking and popping in her knee we will get an MRI to evaluate further  -She may continue with ice, active modification, meloxicam  -We will see her back after MRI or discussed via phone/virtual visit and if there are issues in the interim she will contact the office  -Thank you for the opportunity take part in the care of this patient    Yolis Pettit MD  [address] [facility])      Voice Recognition Dictation disclaimer: Please note that portions of this chart were generated using Dragon dictation software.  Although every effort was made to ensure the accuracy of this automated transcription, some errors in transcription may have occurred.

## 2020-09-21 ENCOUNTER — OFFICE VISIT (OUTPATIENT)
Dept: ORTHOPEDIC SURGERY | Age: 50
End: 2020-09-21
Payer: COMMERCIAL

## 2020-09-21 VITALS — HEIGHT: 65 IN | BODY MASS INDEX: 26.16 KG/M2 | WEIGHT: 157 LBS

## 2020-09-21 PROCEDURE — G8427 DOCREV CUR MEDS BY ELIG CLIN: HCPCS | Performed by: ORTHOPAEDIC SURGERY

## 2020-09-21 PROCEDURE — 3017F COLORECTAL CA SCREEN DOC REV: CPT | Performed by: ORTHOPAEDIC SURGERY

## 2020-09-21 PROCEDURE — 99213 OFFICE O/P EST LOW 20 MIN: CPT | Performed by: ORTHOPAEDIC SURGERY

## 2020-09-21 PROCEDURE — 4004F PT TOBACCO SCREEN RCVD TLK: CPT | Performed by: ORTHOPAEDIC SURGERY

## 2020-09-21 PROCEDURE — G8419 CALC BMI OUT NRM PARAM NOF/U: HCPCS | Performed by: ORTHOPAEDIC SURGERY

## 2020-09-21 NOTE — PROGRESS NOTES
Chief Complaint  Follow-up (Left Knee: MRI Review )      History of Present Illness:  James Bhatt is a 48 y.o. y/o female who presents today for follow up of her left knee. She continues having swelling in her knee. Her pain is little bit better. She still taking meloxicam.  No new falls or acute trauma. Medical History  Past Medical History:   Diagnosis Date    ADHD (attention deficit hyperactivity disorder)     Anxiety     Bipolar disorder (HCC)     COPD [facility] disease) [facility] [address]     Depression        No past surgical history on file.     Social History     Socioeconomic History    Marital status: Single     Spouse name: Not on file    Number of children: Not on file    Years of education: Not on file    Highest education level: Not on file   Occupational History    Not on file   Social Needs    Financial resource strain: Not on file    Food insecurity     Worry: Not on file     Inability: Not on file    Transportation needs     Medical: Not on file     Non-medical: Not on file   Tobacco Use    Smoking status: Current Every Day Smoker     Packs/day: 1.00     Years: 30.00     Pack years: 30.00     Types: Cigarettes    Smokeless tobacco: Never Used   Substance and Sexual Activity    Alcohol use: Yes     Comment: Occasionally    Drug use: Never    Sexual activity: Not Currently   Lifestyle    Physical activity     Days per week: Not on file     Minutes per session: Not on file    Stress: Not on file   Relationships    Social connections     Talks on phone: Not on file     Gets together: Not on file     Attends Mu-ism service: Not on file     Active member of club or organization: Not on file     Attends meetings of clubs or organizations: Not on file     Relationship status: Not on file    Intimate partner violence     Fear of current or ex partner: Not on file     Emotionally abused: Not on file     Physically abused: Not on file     Forced sexual activity: Not on file   Other Topics Concern    Not on file   Social History Narrative    Not on file       Family History   Problem Relation Age of Onset    Breast Cancer Mother     High Blood Pressure Mother     Cancer Father         esophageal    ADHD Sister     Bipolar Disorder Brother     Mental Illness Brother     Heart Disease Maternal Grandmother     Heart Attack Maternal Grandmother 48    Cancer Maternal Grandfather         throat    Alcohol Abuse Maternal Grandfather     Prostate Cancer Maternal Uncle         Review of Systems  Pertinent items are noted in HPI  Review of systems reviewed from Patient History Form dated on 9/3/2020 and available in the patient's chart under the Media tab. Vital Signs  There were no vitals filed for this visit. General Exam:   Constitutional: Patient is adequately groomed with no evidence of malnutrition  Mental Status: The patient is oriented to time, place and person. The patient's mood and affect are appropriate. Lymphatic: The lymphatic examination bilaterally reveals all areas to be without enlargement or induration. Neurological: The patient has good coordination. There is no weakness or sensory deficit. Gait: Normal     Left knee examination  Inspection: 1+ effusion, no erythema    Palpation: Moderate tenderness medial lateral joint line    Range of Motion: 0-140    Sensation: In tact to light touch all nerve distributions     Strength: Knee flexion extension motor intact with moderate quad tone normal distal motor exam    Special Tests: Stable ligament exam unchanged    Skin: There are no additional worrisome rashes, ulcerations or lesions. Circulation normal    Additional Examinations:  Right Lower Extremity: Examination of the right lower extremity does not show any tenderness, deformity or injury. Range of motion is unremarkable. There is no gross instability. There are no rashes, ulcerations or lesions.   Strength and tone are normal.      Radiology:     Exam Date: 09/16/2020    Exam Description: MR Left Knee w/o Contrast              HISTORY:  48year-old female with left knee pain and swelling, ongoing for three months.  No    known trauma.  No history of surgery.  Evaluate for lateral meniscus tear.         TECHNICAL FACTORS:  Long- and short-axis fat- and water-weighted images were performed.         COMPARISON:  None.         FINDINGS:  Lateral tilt of the patella, without subluxation.  Lateral retinaculum and MPFL are    intact.  No high-grade chondromalacia or osteochondral defect of the patellar or trochlear    articular surfaces. Inflammation of the lateral infrapatellar fat pad, suggestive of patellar    maltracking.         Quadriceps tendon and patellar tendon are unremarkable.         No traumatic tear or injury of the ACL, PCL, MCL, or lateral collateral complex. Inflammation    of the soft tissue deep to the distal iliotibial band, suggestive of iliotibial band syndrome.     No posterolateral or posteromedial corner injury.         No acute, active, or traumatic medial meniscus tear.  The meniscocapsular attachments are    intact.         Inner edge fraying of the lateral meniscus midbody, without traumatic or communicating tear.         Moderate-sized joint effusion.  No intra-articular bodies.         Mild chondral thinning medial femoral condyle and tibial plateau weight-bearing surfaces,    without penetrating erosion or reactive osteoedema.  No prominent joint line spurring.         No high-grade chondromalacia of the lateral femorotibial compartment cartilage.         No acute or traumatic bony injury.         No acute or traumatic muscle tear or injury.         No posterior soft tissue mass or bursal cyst.         The neurovascular bundle is intact.                   CONCLUSION:    1. Mild iliotibial band syndrome.     2. Patellar maltracking, with inflammation of the lateral infrapatellar fat pad and lateral    tilt

## 2020-09-29 ENCOUNTER — TELEMEDICINE (OUTPATIENT)
Dept: INTERNAL MEDICINE CLINIC | Age: 50
End: 2020-09-29
Payer: COMMERCIAL

## 2020-09-29 PROCEDURE — 3017F COLORECTAL CA SCREEN DOC REV: CPT | Performed by: INTERNAL MEDICINE

## 2020-09-29 PROCEDURE — G8427 DOCREV CUR MEDS BY ELIG CLIN: HCPCS | Performed by: INTERNAL MEDICINE

## 2020-09-29 PROCEDURE — 99214 OFFICE O/P EST MOD 30 MIN: CPT | Performed by: INTERNAL MEDICINE

## 2020-09-29 RX ORDER — GABAPENTIN 300 MG/1
CAPSULE ORAL
Qty: 270 CAPSULE | Refills: 2 | Status: SHIPPED | OUTPATIENT
Start: 2020-10-01 | End: 2021-07-15 | Stop reason: SDUPTHER

## 2020-09-29 RX ORDER — DEXTROAMPHETAMINE SACCHARATE, AMPHETAMINE ASPARTATE, DEXTROAMPHETAMINE SULFATE AND AMPHETAMINE SULFATE 3.75; 3.75; 3.75; 3.75 MG/1; MG/1; MG/1; MG/1
15 TABLET ORAL 2 TIMES DAILY
Qty: 180 TABLET | Refills: 0 | Status: SHIPPED | OUTPATIENT
Start: 2020-10-01 | End: 2020-12-30 | Stop reason: SDUPTHER

## 2020-09-29 RX ORDER — CITALOPRAM 40 MG/1
40 TABLET ORAL DAILY
Qty: 90 TABLET | Refills: 2 | Status: SHIPPED | OUTPATIENT
Start: 2020-10-01 | End: 2021-07-15 | Stop reason: ALTCHOICE

## 2020-09-29 RX ORDER — MELOXICAM 15 MG/1
15 TABLET ORAL DAILY
Qty: 90 TABLET | Refills: 2 | Status: SHIPPED | OUTPATIENT
Start: 2020-10-01 | End: 2021-07-15 | Stop reason: SDUPTHER

## 2020-09-29 NOTE — PROGRESS NOTES
Date of Service:  9/29/2020    Chief Complaint:      Chief Complaint   Patient presents with    Depression    ADHD    Knee Pain       HPI:  Hari Duran is a 48 y.o. Pursuant to the emergency declaration under the [facility] [place] [address] waiver authority and the [employer] Act, this Virtual  Video Visit was conducted, with patient's consent, to reduce the patient's risk of exposure to COVID-19 and provide continuity of care. Service is  provided through a video synchronous discussion virtually to substitute for in-person clinic visit with the patient being at home and Dr. Mathieu Simpson being at home. She complain of increase left knee pain and swelling for couple of months. No injuries.     ADD:  improved on Adderal 15 mg qd about 4 days a week.  Doing ok daily since just watching her grandkids and no longer working.     Bipolar with depression:  improved on Vraylar 4.5 mg qd. She's still seeing a counselor.   Emphysema:  Stable on Symbicort 160/4.5 2 puff bid.  Have cut back on TOB to < 1/2 ppd recently. Neuralgia in hands and feet:  Improved pain and only feels numbness and pain couple of times a week lasting 1 1/2 hr on Mobic 15 mg qd and Neurontin 300 mg 1 AM and 2 PM and can't increase tid due to sedation.     No results found for: LABA1C, LABMICR  Lab Results   Component Value Date     07/28/2020    K 4.4 07/28/2020     07/28/2020    CO2 22 07/28/2020    BUN 14 07/28/2020    CREATININE 0.7 07/28/2020    GLUCOSE 97 07/28/2020    CALCIUM 9.7 07/28/2020     Lab Results   Component Value Date    CHOL 219 07/28/2020    TRIG 83 07/28/2020    HDL 80 07/28/2020    LDLCALC 122 07/28/2020     Lab Results   Component Value Date    ALT 39 07/28/2020    AST 27 07/28/2020     No results found for: TSH, T4FREE  Lab Results   Component Value Date    WBC 6.5 07/28/2020    HGB 14.2 07/28/2020    HCT 41.7 07/28/2020    MCV 91.8 07/28/2020     07/28/2020     No results found for: INR   No results found for: PSA   No results found for: [facility]     Patient Active Problem List   Diagnosis    Bipolar 1 disorder, mixed, mild ([facility] [address]    Attention deficit disorder (ADD) without hyperactivity    Other emphysema ([facility] [address]    Controlled substance agreement signed       No Known Allergies  Outpatient Medications Marked as Taking for the 9/29/20 encounter (Telemedicine) with Chet Jarrett MD   Medication Sig Dispense Refill    cariprazine hcl (VRAYLAR) 4.5 MG CAPS capsule Take 4.5 mg by mouth daily 30 capsule 0    gabapentin (NEURONTIN) 300 MG capsule 1 AM and 2 PM 90 capsule 0    amphetamine-dextroamphetamine (ADDERALL) 15 MG tablet Take 1 tablet by mouth daily for 30 days. 30 tablet 0    meloxicam (MOBIC) 15 MG tablet Take 1 tablet by mouth daily 30 tablet 0    budesonide-formoterol (SYMBICORT) 160-4.5 MCG/ACT AERO Inhale 2 puffs into the lungs 2 times daily COUPON $0 BIN# [id number]  PCN# CN  [facility]# [id number] ID#  [id number] 3 Inhaler 3    citalopram (CELEXA) 40 MG tablet Take 1 tablet by mouth daily 90 tablet 0         Review of Systems: 14 systems were negative except of what was stated on HPI    Nursing note and vitals reviewed. There were no vitals filed for this visit. Wt Readings from Last 3 Encounters:   09/21/20 157 lb (71.2 kg)   09/03/20 157 lb (71.2 kg)   07/13/20 157 lb (71.2 kg)     BP Readings from Last 3 Encounters:   07/13/20 130/72     There is no height or weight on file to calculate BMI. Constitutional: Patient appears well-developed and well-nourished. No distress. Head: Normocephalic and atraumatic. Skin: No rash or erythema. Psychiatric: Normal mood and affect. Behavior is normal.       Assessment/Plan:  Jovanni Kraft was seen today for depression, adhd and knee pain.     Diagnoses and all orders for this visit:    Attention deficit disorder (ADD) without hyperactivity  Increase amphetamine-dextroamphetamine

## 2020-12-30 ENCOUNTER — TELEMEDICINE (OUTPATIENT)
Dept: INTERNAL MEDICINE CLINIC | Age: 50
End: 2020-12-30
Payer: COMMERCIAL

## 2020-12-30 PROCEDURE — G8427 DOCREV CUR MEDS BY ELIG CLIN: HCPCS | Performed by: INTERNAL MEDICINE

## 2020-12-30 PROCEDURE — 99214 OFFICE O/P EST MOD 30 MIN: CPT | Performed by: INTERNAL MEDICINE

## 2020-12-30 PROCEDURE — 3017F COLORECTAL CA SCREEN DOC REV: CPT | Performed by: INTERNAL MEDICINE

## 2020-12-30 RX ORDER — DEXTROAMPHETAMINE SACCHARATE, AMPHETAMINE ASPARTATE, DEXTROAMPHETAMINE SULFATE AND AMPHETAMINE SULFATE 3.75; 3.75; 3.75; 3.75 MG/1; MG/1; MG/1; MG/1
15 TABLET ORAL 2 TIMES DAILY
Qty: 180 TABLET | Refills: 0 | Status: SHIPPED | OUTPATIENT
Start: 2021-01-12 | End: 2021-04-14 | Stop reason: SDUPTHER

## 2020-12-30 NOTE — PROGRESS NOTES
Date of Service:  12/30/2020    Chief Complaint:      Chief Complaint   Patient presents with    ADHD       HPI:  Rudolph Guallpa is a 48 y.o. Pursuant to the emergency declaration under the [facility], [facility] waiver authority and the [employer] and [employer] Act, this Virtual  Video Visit was conducted, with patient's consent, to reduce the patient's risk of exposure to COVID-19 and provide continuity of care. Service is  provided through a video synchronous discussion virtually to substitute for in-person clinic visit with the patient being at home and Dr. Maria Luisa Hoover being at home. ADD:  improved on Adderal 15 mg bid 7 days a week since work 5 days and have grandkids on weekends.       Bipolar with depression:  stable on Vraylar 4.5 mg qd.    Emphysema:  Stable on Symbicort 160/4.5 2 puff bid.  Have cut back on TOB to < 1/2 ppd recently. Neuralgia in hands and feet:  Improved pain and only feels numbness and pain couple of times a week lasting 1 1/2 hr on Mobic 15 mg qd and Neurontin 300 mg 1tid and can't increase tid due to sedation.     No results found for: LABA1C, LABMICR  Lab Results   Component Value Date     07/28/2020    K 4.4 07/28/2020     07/28/2020    CO2 22 07/28/2020    BUN 14 07/28/2020    CREATININE 0.7 07/28/2020    GLUCOSE 97 07/28/2020    CALCIUM 9.7 07/28/2020     Lab Results   Component Value Date    CHOL 219 07/28/2020    TRIG 83 07/28/2020    HDL 80 07/28/2020    LDLCALC 122 07/28/2020     Lab Results   Component Value Date    ALT 39 07/28/2020    AST 27 07/28/2020     No results found for: TSH, T4FREE  Lab Results   Component Value Date    WBC 6.5 07/28/2020    HGB 14.2 07/28/2020    HCT 41.7 07/28/2020    MCV 91.8 07/28/2020     07/28/2020     No results found for: INR   No results found for: PSA   No results found for: [facility]     Patient Active Problem List   Diagnosis    Bipolar 1 disorder, mixed, mild (HCC)    Attention deficit disorder (ADD) without hyperactivity    Other emphysema ([facility] [address]    Controlled substance agreement signed       No Known Allergies  Outpatient Medications Marked as Taking for the 12/30/20 encounter (Telemedicine) with Kristan Jarrett MD   Medication Sig Dispense Refill    amphetamine-dextroamphetamine (ADDERALL) 15 MG tablet Take 1 tablet by mouth 2 times daily for 92 days. 180 tablet 0    cariprazine hcl (VRAYLAR) 4.5 MG CAPS capsule Take 4.5 mg by mouth daily 90 capsule 0    meloxicam (MOBIC) 15 MG tablet Take 1 tablet by mouth daily 90 tablet 2    citalopram (CELEXA) 40 MG tablet Take 1 tablet by mouth daily 90 tablet 2    budesonide-formoterol (SYMBICORT) 160-4.5 MCG/ACT AERO Inhale 2 puffs into the lungs 2 times daily COUPON $0 BIN# [id number]  PCN# CN  [facility]# [id number] ID#  [id number] 3 Inhaler 3         Review of Systems: 14 systems were negative except of what was stated on HPI    Nursing note and vitals reviewed. There were no vitals filed for this visit. Wt Readings from Last 3 Encounters:   09/21/20 157 lb (71.2 kg)   09/03/20 157 lb (71.2 kg)   07/13/20 157 lb (71.2 kg)     BP Readings from Last 3 Encounters:   07/13/20 130/72     There is no height or weight on file to calculate BMI. Constitutional: Patient appears well-developed and well-nourished. No distress. Head: Normocephalic and atraumatic. Skin: No rash or erythema. Psychiatric: Normal mood and affect. Behavior is normal.       Assessment/Plan:  Kia was seen today for adhd. Diagnoses and all orders for this visit:    Attention deficit disorder (ADD) without hyperactivity  -     amphetamine-dextroamphetamine (ADDERALL) 15 MG tablet; Take 1 tablet by mouth 2 times daily for 90 days. Controlled substance agreement signed  -     amphetamine-dextroamphetamine (ADDERALL) 15 MG tablet; Take 1 tablet by mouth 2 times daily for 90 days.     Bipolar 1 disorder, mixed, mild ([address]  - cariprazine hcl (VRAYLAR) 4.5 MG CAPS capsule; Take 4.5 mg by mouth daily    Neuralgia and neuritis    Stable and continue on current medications. Return April 13 at 1 for ADD.

## 2021-04-13 DIAGNOSIS — J43.8 OTHER EMPHYSEMA (HCC): Primary | ICD-10-CM

## 2021-04-14 ENCOUNTER — TELEMEDICINE (OUTPATIENT)
Dept: INTERNAL MEDICINE CLINIC | Age: 51
End: 2021-04-14
Payer: COMMERCIAL

## 2021-04-14 DIAGNOSIS — F98.8 ATTENTION DEFICIT DISORDER (ADD) WITHOUT HYPERACTIVITY: Primary | ICD-10-CM

## 2021-04-14 DIAGNOSIS — F17.200 TOBACCO DEPENDENCE: ICD-10-CM

## 2021-04-14 DIAGNOSIS — J43.8 OTHER EMPHYSEMA (HCC): ICD-10-CM

## 2021-04-14 DIAGNOSIS — F31.61 BIPOLAR 1 DISORDER, MIXED, MILD (HCC): ICD-10-CM

## 2021-04-14 DIAGNOSIS — Z79.899 CONTROLLED SUBSTANCE AGREEMENT SIGNED: ICD-10-CM

## 2021-04-14 PROCEDURE — 99214 OFFICE O/P EST MOD 30 MIN: CPT | Performed by: INTERNAL MEDICINE

## 2021-04-14 PROCEDURE — G8427 DOCREV CUR MEDS BY ELIG CLIN: HCPCS | Performed by: INTERNAL MEDICINE

## 2021-04-14 PROCEDURE — 3017F COLORECTAL CA SCREEN DOC REV: CPT | Performed by: INTERNAL MEDICINE

## 2021-04-14 RX ORDER — BUPROPION HYDROCHLORIDE 150 MG/1
150 TABLET ORAL EVERY MORNING
Qty: 30 TABLET | Refills: 0 | Status: SHIPPED
Start: 2021-04-14 | End: 2021-05-10 | Stop reason: DRUGHIGH

## 2021-04-14 RX ORDER — DEXTROAMPHETAMINE SACCHARATE, AMPHETAMINE ASPARTATE, DEXTROAMPHETAMINE SULFATE AND AMPHETAMINE SULFATE 3.75; 3.75; 3.75; 3.75 MG/1; MG/1; MG/1; MG/1
15 TABLET ORAL 2 TIMES DAILY
Qty: 180 TABLET | Refills: 0 | Status: SHIPPED | OUTPATIENT
Start: 2021-04-14 | End: 2021-07-15 | Stop reason: SDUPTHER

## 2021-04-14 NOTE — PROGRESS NOTES
Date of Service:  4/14/2021    Chief Complaint:      Chief Complaint   Patient presents with    ADHD       HPI:  Alonso Renteria is a 48 y.o. Pursuant to the emergency declaration under the [facility], [facility] waiver authority and the [employer] and [employer] Act, this Virtual  Video Visit was conducted, with patient's consent, to reduce the patient's risk of exposure to COVID-19 and provide continuity of care. Service is  provided through a video synchronous discussion virtually to substitute for in-person clinic visit with the patient being at home and Dr. Joaquina Oh being at home. Patient consent to the video visit. ADD:  improved concentration on Adderal 15 mg bid 5 days a week when working   Bipolar with depression:  been irritable daily for the past 3 weeks but not depress and not sure why on Celexa 40 mg qd and  Vraylar 4.5 mg qd.     Emphysema:  Stable on Symbicort 160/4.5 2 puff bid.  Have cut back on TOB to 6 cig/day. Neuralgia in hands and feet:  Improved pain and only feels numbness and pain couple of times a week lasting 1 1/2 hr on Mobic 15 mg qd and Neurontin 300 mg tid and can't increase tid due to sedation.     No results found for: LABA1C, LABMICR  Lab Results   Component Value Date     07/28/2020    K 4.4 07/28/2020     07/28/2020    CO2 22 07/28/2020    BUN 14 07/28/2020    CREATININE 0.7 07/28/2020    GLUCOSE 97 07/28/2020    CALCIUM 9.7 07/28/2020     Lab Results   Component Value Date    CHOL 219 07/28/2020    TRIG 83 07/28/2020    HDL 80 07/28/2020    LDLCALC 122 07/28/2020     Lab Results   Component Value Date    ALT 39 07/28/2020    AST 27 07/28/2020     No results found for: TSH, T4FREE  Lab Results   Component Value Date    WBC 6.5 07/28/2020    HGB 14.2 07/28/2020    HCT 41.7 07/28/2020    MCV 91.8 07/28/2020     07/28/2020     No results found for: INR   No results found for: PSA   No results found for: [facility]     Patient Active Problem List   Diagnosis    Bipolar 1 disorder, mixed, mild (HCC)    Attention deficit disorder (ADD) without hyperactivity    Other emphysema ([address]    Controlled substance agreement signed       No Known Allergies  Outpatient Medications Marked as Taking for the 4/14/21 encounter (Telemedicine) with Jonelle Jarrett MD   Medication Sig Dispense Refill    glycopyrrolate-formoterol (BEVESPI AEROSPHERE) 9-4.8 MCG/ACT AERO Inhale 2 puffs into the lungs 2 times daily 3 Inhaler 0    cariprazine hcl (VRAYLAR) 4.5 MG CAPS capsule Take 4.5 mg by mouth daily 90 capsule 1    meloxicam (MOBIC) 15 MG tablet Take 1 tablet by mouth daily 90 tablet 2    citalopram (CELEXA) 40 MG tablet Take 1 tablet by mouth daily 90 tablet 2         Review of Systems: 14 systems were negative except of what was stated on HPI    Nursing note and vitals reviewed. There were no vitals filed for this visit. Wt Readings from Last 3 Encounters:   09/21/20 157 lb (71.2 kg)   09/03/20 157 lb (71.2 kg)   07/13/20 157 lb (71.2 kg)     BP Readings from Last 3 Encounters:   07/13/20 130/72     There is no height or weight on file to calculate BMI. Constitutional: Patient appears well-developed and well-nourished. No distress. Head: Normocephalic and atraumatic. Skin: No rash or erythema. Psychiatric: Normal mood and affect. Behavior is normal.       Assessment/Plan:  Lee Watkins was seen today for adhd. Diagnoses and all orders for this visit:    Attention deficit disorder (ADD) without hyperactivity  -     amphetamine-dextroamphetamine (ADDERALL) 15 MG tablet; Take 1 tablet by mouth 2 times daily for 90 days. Controlled substance agreement signed  -     amphetamine-dextroamphetamine (ADDERALL) 15 MG tablet; Take 1 tablet by mouth 2 times daily for 90 days. Bipolar 1 disorder, mixed, mild (HCC)  Stable and continue on current medications.     Other emphysema ([address]  Stable and continue on current medications. Tobacco dependence  Start  buPROPion (WELLBUTRIN XL) 150 MG extended release tablet; Take 1 tablet by mouth every morning    Controlled Substance Monitoring:    Acute and Chronic Pain Monitoring:   RX Monitoring 4/14/2021   Periodic Controlled Substance Monitoring Possible medication side effects, risk of tolerance/dependence & alternative treatments discussed. ;No signs of potential drug abuse or diversion identified. Return May 12 at 1:20 VV Tob and mood.

## 2021-05-10 ENCOUNTER — TELEMEDICINE (OUTPATIENT)
Dept: INTERNAL MEDICINE CLINIC | Age: 51
End: 2021-05-10
Payer: COMMERCIAL

## 2021-05-10 DIAGNOSIS — F17.200 TOBACCO DEPENDENCE: Primary | ICD-10-CM

## 2021-05-10 DIAGNOSIS — F31.61 BIPOLAR 1 DISORDER, MIXED, MILD (HCC): ICD-10-CM

## 2021-05-10 PROCEDURE — 99213 OFFICE O/P EST LOW 20 MIN: CPT | Performed by: INTERNAL MEDICINE

## 2021-05-10 PROCEDURE — G8427 DOCREV CUR MEDS BY ELIG CLIN: HCPCS | Performed by: INTERNAL MEDICINE

## 2021-05-10 PROCEDURE — 3017F COLORECTAL CA SCREEN DOC REV: CPT | Performed by: INTERNAL MEDICINE

## 2021-05-10 RX ORDER — BUPROPION HYDROCHLORIDE 300 MG/1
300 TABLET ORAL EVERY MORNING
Qty: 30 TABLET | Refills: 0 | Status: SHIPPED
Start: 2021-05-10 | End: 2021-07-15 | Stop reason: DRUGHIGH

## 2021-05-10 NOTE — PROGRESS NOTES
Date of Service:  5/10/2021    Chief Complaint:      Chief Complaint   Patient presents with    Medication Check    ADHD    Manic Behavior       HPI:  Jayson Omalley is a 48 y.o. Pursuant to the emergency declaration under the [facility] authority and the [employer] and [employer] Act, this Virtual  Video Visit was conducted, with patient's consent, to reduce the patient's risk of exposure to COVID-19 and provide continuity of care. Service is  provided through a video synchronous discussion virtually to substitute for in-person clinic visit with the patient being at home and Dr. Lina Thorne being at home. Patient consent to the video visit. ADD:  improved concentration on Adderal 15 mg bid 5 days a week when working   Bipolar with depression:  improvement in irritability from entire day to couple of times a day with starting Wellbutrin  mg qam, Celexa 40 mg qd and Vraylar 4.5 mg qd but she's now picking at her white hair more than usual.  She also started a new job. She denies depression and not sure why.     Emphysema:  Stable on Symbicort 160/4.5 2 puff bid.  Have cut back on TOB to 4 cig/day. Neuralgia in hands and feet:  Improved pain and only feels numbness and pain couple of times a week lasting 1 1/2 hr on Mobic 15 mg qd and Neurontin 300 mg tid and can't increase tid due to sedation.     No results found for: LABA1C, LABMICR  Lab Results   Component Value Date     07/28/2020    K 4.4 07/28/2020     07/28/2020    CO2 22 07/28/2020    BUN 14 07/28/2020    CREATININE 0.7 07/28/2020    GLUCOSE 97 07/28/2020    CALCIUM 9.7 07/28/2020     Lab Results   Component Value Date    CHOL 219 07/28/2020    TRIG 83 07/28/2020    HDL 80 07/28/2020    LDLCALC 122 07/28/2020     Lab Results   Component Value Date    ALT 39 07/28/2020    AST 27 07/28/2020     No results found for: TSH, T4FREE  Lab Results Component Value Date    WBC 6.5 07/28/2020    HGB 14.2 07/28/2020    HCT 41.7 07/28/2020    MCV 91.8 07/28/2020     07/28/2020     No results found for: INR   No results found for: PSA   No results found for: [facility]     Patient Active Problem List   Diagnosis    Bipolar 1 disorder, mixed, mild ([facility] [address]    Attention deficit disorder (ADD) without hyperactivity    Other emphysema ([facility] [address]    Controlled substance agreement signed       No Known Allergies  Outpatient Medications Marked as Taking for the 5/10/21 encounter (Telemedicine) with Lulú Jarrett MD   Medication Sig Dispense Refill    amphetamine-dextroamphetamine (ADDERALL) 15 MG tablet Take 1 tablet by mouth 2 times daily for 90 days. 180 tablet 0    buPROPion (WELLBUTRIN XL) 150 MG extended release tablet Take 1 tablet by mouth every morning 30 tablet 0    glycopyrrolate-formoterol (BEVESPI AEROSPHERE) 9-4.8 MCG/ACT AERO Inhale 2 puffs into the lungs 2 times daily 3 Inhaler 0    cariprazine hcl (VRAYLAR) 4.5 MG CAPS capsule Take 4.5 mg by mouth daily 90 capsule 1    meloxicam (MOBIC) 15 MG tablet Take 1 tablet by mouth daily 90 tablet 2    citalopram (CELEXA) 40 MG tablet Take 1 tablet by mouth daily 90 tablet 2         Review of Systems: 14 systems were negative except of what was stated on HPI    Nursing note and vitals reviewed. There were no vitals filed for this visit. Wt Readings from Last 3 Encounters:   09/21/20 157 lb (71.2 kg)   09/03/20 157 lb (71.2 kg)   07/13/20 157 lb (71.2 kg)     BP Readings from Last 3 Encounters:   07/13/20 130/72     There is no height or weight on file to calculate BMI. Constitutional: Patient appears well-developed and well-nourished. No distress. Head: Normocephalic and atraumatic. Skin: No rash or erythema. Psychiatric: Normal mood and affect. Behavior is normal.       Assessment/Plan:  Radha Pimentel was seen today for medication check, adhd and manic behavior.     Diagnoses and all orders for this visit: Tobacco dependence  Increase buPROPion (WELLBUTRIN XL) 300 MG extended release tablet; Take 1 tablet by mouth every morning    Bipolar 1 disorder, mixed, mild (HCC)  -     buPROPion (WELLBUTRIN XL) 300 MG extended release tablet; Take 1 tablet by mouth every morning  Pt to f/u with psych since now having OCD about picking her white hair off      Return June 8 at 11:30 (can't do it earlier)  VV Anxiety.

## 2021-07-09 DIAGNOSIS — F31.61 BIPOLAR 1 DISORDER, MIXED, MILD (HCC): ICD-10-CM

## 2021-07-12 RX ORDER — CARIPRAZINE 4.5 MG/1
CAPSULE, GELATIN COATED ORAL
Qty: 90 CAPSULE | Refills: 0 | OUTPATIENT
Start: 2021-07-12

## 2021-07-15 ENCOUNTER — VIRTUAL VISIT (OUTPATIENT)
Dept: INTERNAL MEDICINE CLINIC | Age: 51
End: 2021-07-15
Payer: COMMERCIAL

## 2021-07-15 ENCOUNTER — TELEPHONE (OUTPATIENT)
Dept: INTERNAL MEDICINE CLINIC | Age: 51
End: 2021-07-15

## 2021-07-15 DIAGNOSIS — Z79.899 CONTROLLED SUBSTANCE AGREEMENT SIGNED: ICD-10-CM

## 2021-07-15 DIAGNOSIS — F98.8 ATTENTION DEFICIT DISORDER (ADD) WITHOUT HYPERACTIVITY: ICD-10-CM

## 2021-07-15 DIAGNOSIS — M25.562 ACUTE PAIN OF LEFT KNEE: ICD-10-CM

## 2021-07-15 DIAGNOSIS — F31.61 BIPOLAR 1 DISORDER, MIXED, MILD (HCC): ICD-10-CM

## 2021-07-15 DIAGNOSIS — M79.2 NEURALGIA AND NEURITIS: ICD-10-CM

## 2021-07-15 DIAGNOSIS — J43.8 OTHER EMPHYSEMA (HCC): ICD-10-CM

## 2021-07-15 PROCEDURE — G8427 DOCREV CUR MEDS BY ELIG CLIN: HCPCS | Performed by: INTERNAL MEDICINE

## 2021-07-15 PROCEDURE — 99214 OFFICE O/P EST MOD 30 MIN: CPT | Performed by: INTERNAL MEDICINE

## 2021-07-15 PROCEDURE — 3017F COLORECTAL CA SCREEN DOC REV: CPT | Performed by: INTERNAL MEDICINE

## 2021-07-15 RX ORDER — DEXTROAMPHETAMINE SACCHARATE, AMPHETAMINE ASPARTATE, DEXTROAMPHETAMINE SULFATE AND AMPHETAMINE SULFATE 3.75; 3.75; 3.75; 3.75 MG/1; MG/1; MG/1; MG/1
15 TABLET ORAL 2 TIMES DAILY
Qty: 180 TABLET | Refills: 0 | Status: SHIPPED | OUTPATIENT
Start: 2021-07-15 | End: 2021-08-17 | Stop reason: SDUPTHER

## 2021-07-15 RX ORDER — DEXTROAMPHETAMINE SACCHARATE, AMPHETAMINE ASPARTATE, DEXTROAMPHETAMINE SULFATE AND AMPHETAMINE SULFATE 3.75; 3.75; 3.75; 3.75 MG/1; MG/1; MG/1; MG/1
15 TABLET ORAL 2 TIMES DAILY
Qty: 180 TABLET | Refills: 0 | Status: CANCELLED | OUTPATIENT
Start: 2021-07-15 | End: 2021-10-13

## 2021-07-15 RX ORDER — GABAPENTIN 300 MG/1
CAPSULE ORAL
Qty: 270 CAPSULE | Refills: 2 | Status: SHIPPED | OUTPATIENT
Start: 2021-07-15 | End: 2022-08-23 | Stop reason: ALTCHOICE

## 2021-07-15 RX ORDER — MELOXICAM 15 MG/1
15 TABLET ORAL DAILY
Qty: 90 TABLET | Refills: 3 | Status: SHIPPED | OUTPATIENT
Start: 2021-07-15

## 2021-07-15 RX ORDER — BUPROPION HYDROCHLORIDE 150 MG/1
150 TABLET ORAL EVERY MORNING
Qty: 90 TABLET | Refills: 0 | Status: SHIPPED | OUTPATIENT
Start: 2021-07-15 | End: 2021-09-10

## 2021-07-15 SDOH — ECONOMIC STABILITY: FOOD INSECURITY: WITHIN THE PAST 12 MONTHS, THE FOOD YOU BOUGHT JUST DIDN'T LAST AND YOU DIDN'T HAVE MONEY TO GET MORE.: NEVER TRUE

## 2021-07-15 SDOH — ECONOMIC STABILITY: FOOD INSECURITY: WITHIN THE PAST 12 MONTHS, YOU WORRIED THAT YOUR FOOD WOULD RUN OUT BEFORE YOU GOT MONEY TO BUY MORE.: NEVER TRUE

## 2021-07-15 ASSESSMENT — SOCIAL DETERMINANTS OF HEALTH (SDOH): HOW HARD IS IT FOR YOU TO PAY FOR THE VERY BASICS LIKE FOOD, HOUSING, MEDICAL CARE, AND HEATING?: NOT HARD AT ALL

## 2021-07-15 NOTE — TELEPHONE ENCOUNTER
Patient needs refill of medication   Refill request foramphetamine-dextroamphetamine (ADDERALL) 15 MG tablet [[id number]]    medication.      Name of [address] [place] [address] 693, [address] [facility] [phone] Олег Armaan [phone]        Last visit - 5.10.21     Pending visit - 8.17.21     Last refill -4.14.21

## 2021-07-15 NOTE — PROGRESS NOTES
Pursuant to the emergency declaration under the [facility], [facility] authority and the [employer] and [employer] Act, this Virtual  Video Visit was conducted, with patient's consent, to reduce the patient's risk of exposure to COVID-19 and provide continuity of care. Service is  provided through a video synchronous discussion virtually to substitute for in-person clinic visit with the patient being at home and Dr. Kristi Collins being at home. Patient consent to the video visit. Date of Service:  7/15/2021    Chief Complaint:      Chief Complaint   Patient presents with    ADHD       Assessment/Plan:    Tracey Guzman was seen today for adhd. Diagnoses and all orders for this visit:    Attention deficit disorder (ADD) without hyperactivity  -     amphetamine-dextroamphetamine (ADDERALL) 15 MG tablet; Take 1 tablet by mouth 2 times daily for 90 days. Controlled substance agreement signed  -     amphetamine-dextroamphetamine (ADDERALL) 15 MG tablet; Take 1 tablet by mouth 2 times daily for 90 days. Other emphysema ([address]  -     glycopy[facility]rolate-formoterol ([facility]) 9-4.8 MCG/ACT AERO; Inhale 2 puffs into the lungs 2 times daily    Bipolar 1 disorder, mixed, mild (HCC)  -     buPROPion (WELLBUTRIN XL) 150 MG extended release tablet; Take 1 tablet by mouth every morning  -     cariprazine hcl (VRAYLAR) 4.5 MG CAPS capsule; Take 4.5 mg by mouth daily    Acute pain of left knee  -     meloxicam (MOBIC) 15 MG tablet; Take 1 tablet by mouth daily    Neuralgia and neuritis  -     meloxicam (MOBIC) 15 MG tablet; Take 1 tablet by mouth daily  -     gabapentin (NEURONTIN) 300 MG capsule; 1 AM and 2 PM      Controlled Substance Monitoring:    Acute and Chronic Pain Monitoring:   RX Monitoring 4/14/2021   Periodic Controlled Substance Monitoring Possible medication side effects, risk of tolerance/dependence & alternative treatments discussed. ;No signs of potential drug abuse or diversion identified. Return Oct 14 at 11:40 Fasting Physical and ADD f/u. HPI:  Edelmira Horn is a 48 y.o. ADD:  improved concentration on Adderal 15 mg bid 5 days a week when working   Bipolar with depression:  stable on Wellbutrin  mg qd and Vraylar 4.5 mg qd.  She's seeing a therapist.  Hershall Arslan on [address] 2 puff bid.  Have cut back on TOB to 6 cig/day. Neuralgia in hands and feet:  Improved pain and only feels numbness and pain couple of times a week lasting 1 1/2 hr on Mobic 15 mg qd and Neurontin 300 mg tid and can't increase tid due to sedation.     No results found for: Kalanishakira Dillon  Lab Results   Component Value Date     07/28/2020    K 4.4 07/28/2020     07/28/2020    CO2 22 07/28/2020    BUN 14 07/28/2020    CREATININE 0.7 07/28/2020    GLUCOSE 97 07/28/2020    CALCIUM 9.7 07/28/2020     Lab Results   Component Value Date    CHOL 219 07/28/2020    TRIG 83 07/28/2020    HDL 80 07/28/2020    LDLCALC 122 07/28/2020     Lab Results   Component Value Date    ALT 39 07/28/2020    AST 27 07/28/2020     No results found for: TSH, T4FREE  Lab Results   Component Value Date    WBC 6.5 07/28/2020    HGB 14.2 07/28/2020    HCT 41.7 07/28/2020    MCV 91.8 07/28/2020     07/28/2020     No results found for: INR   No results found for: PSA   No results found for: LABURIC     Patient Active Problem List   Diagnosis    Bipolar 1 disorder, mixed, mild (HCC)    Attention deficit disorder (ADD) without hyperactivity    Other emphysema ([address]    Controlled substance agreement signed       No Known Allergies  Outpatient Medications Marked as Taking for the 7/15/21 encounter (Virtual Visit) with Silvano Gates MD   Medication Sig Dispense Refill    buPROPion (WELLBUTRIN XL) 300 MG extended release tablet Take 1 tablet by mouth every morning 30 tablet 0    glycopyrrolate-formoterol (BEVESPI AEROSPHERE) 9-4.8 MCG/ACT AERO Inhale 2 puffs into the lungs 2 times daily 3 Inhaler 0    cariprazine hcl (VRAYLAR) 4.5 MG CAPS capsule Take 4.5 mg by mouth daily 90 capsule 1    meloxicam (MOBIC) 15 MG tablet Take 1 tablet by mouth daily 90 tablet 2    citalopram (CELEXA) 40 MG tablet Take 1 tablet by mouth daily 90 tablet 2         Review of Systems: 14 systems were negative except of what was stated on HPI    Nursing note and vitals reviewed. There were no vitals filed for this visit. Wt Readings from Last 3 Encounters:   09/21/20 157 lb (71.2 kg)   09/03/20 157 lb (71.2 kg)   07/13/20 157 lb (71.2 kg)     BP Readings from Last 3 Encounters:   07/13/20 130/72     There is no height or weight on file to calculate BMI. Constitutional: Patient appears well-developed and well-nourished. No distress. Head: Normocephalic and atraumatic. Skin: No rash or erythema. Psychiatric: Normal mood and affect.  Behavior is normal.

## 2021-07-16 ENCOUNTER — TELEPHONE (OUTPATIENT)
Dept: ADMINISTRATIVE | Age: 51
End: 2021-07-16

## 2021-07-16 NOTE — TELEPHONE ENCOUNTER
PA COVER MY MEDS  Medication:Amphetamine-Dextroamphetamine 15MG tablets  Key:[id number] - Rx #: [id number]  Status:Drug is covered by current benefit plan.  No further PA activity needed

## 2021-08-03 ENCOUNTER — TELEMEDICINE (OUTPATIENT)
Dept: INTERNAL MEDICINE CLINIC | Age: 51
End: 2021-08-03
Payer: COMMERCIAL

## 2021-08-03 DIAGNOSIS — R13.19 OTHER DYSPHAGIA: Primary | ICD-10-CM

## 2021-08-03 PROCEDURE — 99213 OFFICE O/P EST LOW 20 MIN: CPT | Performed by: INTERNAL MEDICINE

## 2021-08-03 PROCEDURE — 3017F COLORECTAL CA SCREEN DOC REV: CPT | Performed by: INTERNAL MEDICINE

## 2021-08-03 PROCEDURE — G8427 DOCREV CUR MEDS BY ELIG CLIN: HCPCS | Performed by: INTERNAL MEDICINE

## 2021-08-03 NOTE — PROGRESS NOTES
Pursuant to the emergency declaration under the [facility], [facility] waiver authority and the [employer] Act, this Virtual  Video Visit was conducted, with patient's consent, to reduce the patient's risk of exposure to COVID-19 and provide continuity of care. Service is  provided through a video synchronous discussion virtually to substitute for in-person clinic visit with the patient being at home and Dr. Gerry Guerrier being at home. Patient consent to the video visit. Date of Service:  8/3/2021    Chief Complaint:      Chief Complaint   Patient presents with    Dysphagia     asking for referral to throat specialist.        Assessment/Plan:    Олег Lebron was seen today for referral - general.    Diagnoses and all orders for this visit:    Other dysphagia  -     [facility]      Stable and continue on current medications. Return if symptoms worsen or fail to improve. HPI:  Freddy Acosta is a 48 y.o. She complain of food getting stuck in her throat with or without eating. She feels something is in her throat while talking to me and it feels like that all the time for the past week. She denies allergies or GERD symptoms.       No results found for: LABA1C, LABMICR  Lab Results   Component Value Date     07/28/2020    K 4.4 07/28/2020     07/28/2020    CO2 22 07/28/2020    BUN 14 07/28/2020    CREATININE 0.7 07/28/2020    GLUCOSE 97 07/28/2020    CALCIUM 9.7 07/28/2020     Lab Results   Component Value Date    CHOL 219 07/28/2020    TRIG 83 07/28/2020    HDL 80 07/28/2020    LDLCALC 122 07/28/2020     Lab Results   Component Value Date    ALT 39 07/28/2020    AST 27 07/28/2020     No results found for: TSH, T4FREE  Lab Results   Component Value Date    WBC 6.5 07/28/2020    HGB 14.2 07/28/2020    HCT 41.7 07/28/2020    MCV 91.8 07/28/2020     07/28/2020     No results found for: INR No results found for: PSA   No results found for: [facility]     Patient Active Problem List   Diagnosis    Bipolar 1 disorder, mixed, mild ([address]    Attention deficit disorder (ADD) without hyperactivity    Other emphysema ([address]    Controlled substance agreement signed       No Known Allergies  Outpatient Medications Marked as Taking for the 8/3/21 encounter (Telemedicine) with Lee Jarrett MD   Medication Sig Dispense Refill    buPROPion (WELLBUTRIN XL) 150 MG extended release tablet Take 1 tablet by mouth every morning 90 tablet 0    amphetamine-dextroamphetamine (ADDERALL) 15 MG tablet Take 1 tablet by mouth 2 times daily for 90 days. 180 tablet 0    glycopyrrolate-formoterol (BEVESPI AEROSPHERE) 9-4.8 MCG/ACT AERO Inhale 2 puffs into the lungs 2 times daily 3 Inhaler 0    cariprazine hcl (VRAYLAR) 4.5 MG CAPS capsule Take 4.5 mg by mouth daily 90 capsule 1    meloxicam (MOBIC) 15 MG tablet Take 1 tablet by mouth daily 90 tablet 3    gabapentin (NEURONTIN) 300 MG capsule 1 AM and 2  capsule 2         Review of Systems: 14 systems were negative except of what was stated on HPI    Nursing note and vitals reviewed. There were no vitals filed for this visit. Wt Readings from Last 3 Encounters:   09/21/20 157 lb (71.2 kg)   09/03/20 157 lb (71.2 kg)   07/13/20 157 lb (71.2 kg)     BP Readings from Last 3 Encounters:   07/13/20 130/72     There is no height or weight on file to calculate BMI. Constitutional: Patient appears well-developed and well-nourished. No distress. Head: Normocephalic and atraumatic. Skin: No rash or erythema. Psychiatric: Normal mood and affect.  Behavior is normal.

## 2021-08-10 NOTE — PROGRESS NOTES
[address] [facility],  [address] [place] [address] Ave  [place], [address] [place] [address]  Phone: [phone]   [facility]     Chief Complaint   Patient presents with    New Patient     feels like something is caught in throat       HPI     Thank you DARRON AL MD for asking me to see Анна Leung in consultation. Анна Leung is a 48 y.o. female Single [1] White (non-) [1] with medical history of bipolar disorder, ADD, and emphysema seen independently with referral for sensation of food stuck in throat of about 2 weeks duration. Denies associated odynophagia, or chest pain. Reports occasional heartburn for which she takes tums with adequate relief. Denies abdominal pain, nausea, vomiting, fever, chills, unintentional weight loss, constipation, diarrhea, hematochezia or melenic stools. Last Encounter Reviewed: None  Pertinent PMH, FH, SH is reviewed below. Last EGD: None  Last Colonoscopy: None    Review of available records reveals:   Wt Readings from Last 50 Encounters:   08/11/21 157 lb 9.6 oz (71.5 kg)   09/21/20 157 lb (71.2 kg)   09/03/20 157 lb (71.2 kg)   07/13/20 157 lb (71.2 kg)       No components found for: HGBA1C  BP Readings from Last 3 Encounters:   08/11/21 115/76   07/13/20 130/72     Health Maintenance   Topic Date Due    Hepatitis C screen  Never done    Pneumococcal 0-64 years Vaccine (1 of 2 - PPSV23) Never done    COVID-19 Vaccine (1) Never done    Cervical cancer screen  Never done    Colon cancer screen colonoscopy  Never done    Breast cancer screen  02/27/2016    Shingles Vaccine (1 of 2) Never done    Low dose CT lung screening  Never done    Flu vaccine (1) 09/01/2021    Lipid screen  07/28/2025    DTaP/Tdap/Td vaccine (2 - Td or Tdap) 07/13/2030    HIV screen  Completed    Hepatitis A vaccine  Aged Out    Hepatitis B vaccine  Aged Out    Hib vaccine  Aged Out    Meningococcal (ACWY) vaccine  Aged Out       No components found Frequency of Communication with Friends and Family:     Frequency of Social Gatherings with Friends and Family:     Attends Jew Services:     Active Member of Clubs or Organizations:     Attends Club or Organization Meetings:     Marital Status:    Intimate Partner Violence:     Fear of Current or Ex-Partner:     Emotionally Abused:     Physically Abused:     Sexually Abused:      SURGICAL HISTORY   History reviewed. No pertinent surgical history. CURRENT MEDICATIONS   (This list may include medications prescribed during this encounter as epic can not insert only the list prior to this encounter.)  Current Outpatient Rx   Medication Sig Dispense Refill    pantoprazole (PROTONIX) 40 MG tablet Take 1 tablet by mouth every morning (before breakfast) 30 tablet 1    buPROPion (WELLBUTRIN XL) 150 MG extended release tablet Take 1 tablet by mouth every morning 90 tablet 0    amphetamine-dextroamphetamine (ADDERALL) 15 MG tablet Take 1 tablet by mouth 2 times daily for 90 days. 180 tablet 0    glycopyrrolate-formoterol (BEVESPI AEROSPHERE) 9-4.8 MCG/ACT AERO Inhale 2 puffs into the lungs 2 times daily 3 Inhaler 0    cariprazine hcl (VRAYLAR) 4.5 MG CAPS capsule Take 4.5 mg by mouth daily 90 capsule 1    meloxicam (MOBIC) 15 MG tablet Take 1 tablet by mouth daily 90 tablet 3    gabapentin (NEURONTIN) 300 MG capsule 1 AM and 2  capsule 2     ALLERGIES   No Known Allergies  IMMUNIZATIONS     Immunization History   Administered Date(s) Administered    Tdap (Boostrix, Adacel) 07/13/2020     REVIEW OF SYSTEMS   See HPI for further details and pertinent postiives. Negative for the following:  Constitutional: Negative for weight change. Negative for appetite change and fatigue. HENT: Negative for nosebleeds, sore throat, mouth sores, and voice change. Respiratory: Negative for cough, choking and chest tightness.    Cardiovascular: Negative for chest pain   Gastrointestinal: See HPI  Musculoskeletal: Negative for arthralgias. Skin: Negative for pallor. Neurological: Negative for weakness and light-headedness. Hematological: Negative for adenopathy. Does not bruise/bleed easily. Psychiatric/Behavioral: Negative for suicidal ideas. PHYSICAL EXAM   VITAL SIGNS: /76 (Site: Left Wrist, Position: Sitting, Cuff Size: Medium Adult)   Pulse 85   Ht 5' 5\" (1.651 m)   Wt 157 lb 9.6 oz (71.5 kg)   BMI 26.23 kg/m²   Wt Readings from Last 3 Encounters:   08/11/21 157 lb 9.6 oz (71.5 kg)   09/21/20 157 lb (71.2 kg)   09/03/20 157 lb (71.2 kg)     Constitutional: Well developed, Well nourished, No acute distress, Non-toxic appearance. HENT: Normocephalic, Atraumatic, Bilateral external ears normal, Oropharynx moist, Nose normal.   Eyes: Conjunctiva normal, No discharge. Neck: Normal range of motion, No tenderness, Supple, No stridor. Cardiovascular: Normal heart rate, Normal rhythm, No murmurs, No rubs, No gallops. Thorax & Lungs: Normal breath sounds, No respiratory distress, No wheezing, No chest tenderness. Abdomen: normal bowel sounds, soft, non tender, non distended, no hernias,  Rectal:  Deferred. Skin: Warm, Dry, No erythema, No rash. No bruising. .  Back: No tenderness, No CVA tenderness. Lower Extremities: Intact distal pulses, No edema, No tenderness, No cyanosis, No clubbing. Neurologic: Alert & oriented x 3, Normal motor function, Normal sensory function, No focal deficits noted. RADIOLOGY/PROCEDURES   No results found. FINAL IMPRESSION   Tracey Guzman was seen today for new patient. Diagnoses and all orders for this visit:    Oropharyngeal dysphagia  -     [place] ESOPHAGRAM; Future  -     Covid-19 Ambulatory; Future  -     EGD with biopsy and possible dilation  -     pantoprazole (PROTONIX) 40 MG tablet;  Take 1 tablet by mouth every morning (before breakfast)    Esophagogastroduodenoscopy (EGD) with alternatives, rationale, benefits and risks, not limited to bleeding, infection, perforation, need of surgery, prolong hospital stay and anesthesia side effects were explained. Patient verbalized understanding and agrees to proceed with EGD. To consider colonoscopy for colon cancer screening after resolution of dysphagia         Orders Placed This Encounter   Procedures    [place] ESOPHAGRAM     Standing Status:   Future     Standing Expiration Date:   8/11/2022     Order Specific Question:   Reason for exam:     Answer:   sensation of food stuck in throat    Covid-19 Ambulatory     Standing Status:   Future     Standing Expiration Date:   8/11/2022     Scheduling Instructions:      Saline media preferred given current shortage of viral transport media but both acceptable     Order Specific Question:   Status     Answer:   Asymptomatic/Surveillance (e.g. pre-op/pre-procedure, pre-delivery, transfer)     Order Specific Question:   Reason for Test     Answer:   Upcoming elective surgery/procedure/delivery, return to work, or discharge to another facility    EGD     Scheduling Instructions:      Schedule with anesthesia provided diprivan sedation. Please provide prep of choice instructions and prescription. General guidelines for holding blood thinners/anticoagulants around endoscopic procedure are but patients are encouraged to check with their prescribing physician. The patient may hold Plavix, Effient, Brilinta 5 days prior to the procedure unless:       A drug eluting stent has been placed within past 12 months. A nondrug eluting stent has been placed within past 1 month. Coumadin may be held 4 days prior to the procedure unless:        Mechanical mitral valve replacement (requires heparin bridge while Coumadin held and is managed by pharmacy)      Pradaxa, Xarelto, Eliquis may be held 2-3 days prior to procedure.   According to pharmacokinetics of the drug, package insert, cardiology practice patterns, and T1/2 of theses drugs (12 hrs), Eliquis and Xarelto are held 48hrs prior to any procedure, including major surgical procedures w/o       increased bleeding.  That is usually the standard of care, as coagulation would/should be normalized at 48hrs. Every attempt should be made to maintain ASA 81mg per day throughout the easton-operative period in patients with diagnosis of ASHD. These recommendations may need to be modified by the provider/ based on risk /benefit analysis of the procedure and the patients history. If anticoagulation can not be held because recent cardiac stent, elective endoscopic procedures should be delayed until they have received the minimum duration of recommended antiplatlet therapy and it can safely be held. Again if unsure, patient should discuss with prescribing physician/service. If anticoagulation can not be stopped, endoscopic procedures can still be performed either diagnostically at a somewhat higher risk. Understand that any therapeutic procedure where anything beyond looking is performed, carries higher risks. For this reason without overt bleeding other testing       such as cologuard may be more appropriate. High risk endoscopic procedures that require stopping antiplatelet and anticoagulation therapy include polypectomy, biliary or pancreatic sphincterotomy, pneumatic or bougie dilation, PEG placement, therapeutic balloon-assisted enteroscopy, EUS and FNA, tumor ablation by any technique,       cystogastrostomy,and treatment of varices. Order Specific Question:   Screening or Diagnostic? Answer:   Diagnostic       ORDERED FUTURE/PENDING TESTS     Lab Frequency Next Occurrence   MRI KNEE LEFT WO CONTRAST Once 09/03/2020       FOLLOWUP   Return in about 3 months (around 11/11/2021).           Nila Meckel, MD 8/10/21 11:56 AM EDT    CC:  Maria Luz Gifford MD

## 2021-08-11 ENCOUNTER — TELEPHONE (OUTPATIENT)
Dept: GASTROENTEROLOGY | Age: 51
End: 2021-08-11

## 2021-08-11 ENCOUNTER — INITIAL CONSULT (OUTPATIENT)
Dept: GASTROENTEROLOGY | Age: 51
End: 2021-08-11
Payer: COMMERCIAL

## 2021-08-11 VITALS
HEART RATE: 85 BPM | SYSTOLIC BLOOD PRESSURE: 115 MMHG | DIASTOLIC BLOOD PRESSURE: 76 MMHG | BODY MASS INDEX: 26.26 KG/M2 | WEIGHT: 157.6 LBS | HEIGHT: 65 IN

## 2021-08-11 DIAGNOSIS — R13.12 OROPHARYNGEAL DYSPHAGIA: Primary | ICD-10-CM

## 2021-08-11 PROCEDURE — G8419 CALC BMI OUT NRM PARAM NOF/U: HCPCS | Performed by: INTERNAL MEDICINE

## 2021-08-11 PROCEDURE — G8427 DOCREV CUR MEDS BY ELIG CLIN: HCPCS | Performed by: INTERNAL MEDICINE

## 2021-08-11 PROCEDURE — 99244 OFF/OP CNSLTJ NEW/EST MOD 40: CPT | Performed by: INTERNAL MEDICINE

## 2021-08-11 RX ORDER — PANTOPRAZOLE SODIUM 40 MG/1
40 TABLET, DELAYED RELEASE ORAL
Qty: 30 TABLET | Refills: 1 | Status: SHIPPED | OUTPATIENT
Start: 2021-08-11 | End: 2021-10-18 | Stop reason: SDUPTHER

## 2021-08-11 NOTE — PATIENT INSTRUCTIONS
Patient Education        Learning About Swallowing Problems  What are swallowing problems? Certain health problems that affect the nervous system can cause trouble swallowing. These conditions include stroke, ALS (also known as Geeta Gehrig's disease), Parkinson's disease, and multiple sclerosis. The muscles and nerves that help move food through the throat and esophagus may not work right. Growths, such as cancer, and other problems with your esophagus can also make it hard to swallow. The esophagus is the tube that leads from your throat to your stomach. How are swallowing problems diagnosed? A doctor or speech therapist will examine you to check for swallowing problems. You may get swallowing tests to check how well your throat muscles work. For these tests, you swallow a special liquid that helps the doctor see your throat and esophagus on an X-ray or video screen. Other tests use a thin, flexible tube called a scope to check for problems with your esophagus. The doctor puts the scope in your mouth and down your throat to look at your esophagus. What are the symptoms? Symptoms of swallowing problems may include:  · Trouble getting food or liquids to go down on the first try. · Gagging, choking, or coughing when you swallow. · Having food or liquids come back up through your throat, mouth, or nose after you swallow. · Feeling like foods or liquids are stuck in some part of your throat or chest.  · Pain when you swallow. How are swallowing problems treated? How swallowing problems are treated depends on the cause. The main goals of treatment will be to help you eat and swallow safely and get good nutrition. This is important for your health and quality of life. You may learn exercises to train your throat muscles to work together so you're able to swallow better. Learning certain ways to put food in your mouth or to position your head while eating may also help.   Your doctor or a speech therapist may recommend changes to your diet to help make it easier to swallow. You may need to avoid certain foods or liquids. You also may need to change the thickness of foods or liquids in your diet. To eat and swallow safely, follow any instructions you get from your doctor or therapist. These ideas may help:  · Sit upright when eating, drinking, and taking pills. · Take small bites of food. Chew completely and swallow before taking another bite. · Take small sips of liquids. · If eating makes you tired, eat smaller but more frequent meals. · Tip your chin down when there is food in your mouth. Where can you learn more? Go to https://TraveDocpepiceweb.RazorGator. org and sign in to your Friday account. Enter G649 in the CakeStyle box to learn more about \"Learning About Swallowing Problems. \"     If you do not have an account, please click on the \"Sign Up Now\" link. Current as of: October 19, 2020               Content Version: 12.9  © 2006-2021 [employer] Pauer [employer]. Care instructions adapted under license by [facility] ([facility]). If you have questions about a medical condition or this instruction, always ask your healthcare professional. [place] [address] any warranty or liability for your use of this information. Patient Education        Upper GI Endoscopy: Before Your Procedure  What is an upper GI endoscopy? An upper gastrointestinal (or GI) endoscopy is a test that allows your doctor to look at the inside of your esophagus, stomach, and the first part of your small intestine, called the duodenum. The esophagus is the tube that carries food to your stomach. The doctor uses a thin, lighted tube that bends. It is called an endoscope, or scope. The doctor puts the tip of the scope in your mouth and gently moves it down your throat. The scope is a flexible video camera. The doctor looks at a monitor (like a TV set or a computer screen) as he or she moves the scope.  A doctor may do this procedure to look for ulcers, tumors, infection, or bleeding. It also can be used to look for signs of acid backing up into your esophagus. This is called gastroesophageal reflux disease, or GERD. The doctor can use the scope to take a sample of tissue for study (a biopsy). The doctor also can use the scope to take out growths or stop bleeding. Follow-up care is a key part of your treatment and safety. Be sure to make and go to all appointments, and call your doctor if you are having problems. It's also a good idea to know your test results and keep a list of the medicines you take. How do you prepare for the procedure? Procedures can be stressful. This information will help you understand what you can expect. And it will help you safely prepare for your procedure. Preparing for the procedure    · Do not eat or drink anything for 6 to 8 hours before the test. An empty stomach helps your doctor see your stomach clearly during the test. It also reduces your chances of vomiting. If you vomit, there is a small risk that the vomit could enter your lungs. (This is called aspiration.) If the test is done in an emergency, a tube may be inserted through your nose or mouth to empty your stomach.     · Do not take sucralfate (Carafate) or antacids on the day of the test. These medicines can make it hard for your doctor to see your upper GI tract.     · If your doctor tells you to, stop taking iron supplements 7 to 14 days before the test.     · Be sure you have someone to take you home. Anesthesia and pain medicine will make it unsafe for you to drive or get home on your own.     · Understand exactly what procedure is planned, along with the risks, benefits, and other options. · Tell your doctor ALL the medicines, vitamins, supplements, and herbal remedies you take. Some may increase the risk of problems during your procedure.  Your doctor will tell you if you should stop taking any of them before the procedure and how soon to do it.     · If you take aspirin or some other blood thinner, ask your doctor if you should stop taking it before your procedure. Make sure that you understand exactly what your doctor wants you to do. These medicines increase the risk of bleeding.     · Make sure your doctor and the hospital have a copy of your advance directive. If you don't have one, you may want to prepare one. It lets others know your health care wishes. It's a good thing to have before any type of surgery or procedure. What happens on the day of the procedure? · Follow the instructions exactly about when to stop eating and drinking. If you don't, your procedure may be canceled. If your doctor told you to take your medicines on the day of the procedure, take them with only a sip of water.     · Take a bath or shower before you come in for your procedure. Do not apply lotions, perfumes, deodorants, or nail polish.     · Take off all jewelry and piercings. And take out contact lenses, if you wear them. At the hospital or surgery center   · Bring a picture ID.     · The test may take 15 to 30 minutes.     · The doctor may spray medicine on the back of your throat to numb it. You also will get medicine to prevent pain and to relax you.     · You will lie on your left side. The doctor will put the scope in your mouth and toward the back of your throat. The doctor will tell you when to swallow. This helps the scope move down your throat. You will be able to breathe normally. The doctor will move the scope down your esophagus into your stomach. The doctor also may look at the duodenum.     · If your doctor wants to take a sample of tissue for a biopsy, he or she may use small surgical tools, which are put into the scope, to cut off some tissue. You will not feel a biopsy, if one is taken.  The doctor also can use the tools to stop bleeding or to do other treatments, if needed.     · You will stay at the hospital or surgery center for 1 to 2 hours until the medicine you were given wears off. What happens after an upper GI endoscopy? · After the test, you may belch and feel bloated for a while.     · You may have a tickling, dry throat or mouth. You may feel a bit hoarse, and you may have a mild sore throat. These symptoms may last several days. Throat lozenges and warm saltwater gargles can help relieve the throat symptoms.     · Don't drive or operate machinery for 12 hours after the test.     · Your doctor will tell you when you can go back to your usual diet and activities.     · Don't drink alcohol for 12 to 24 hours after the test.   When should you call your doctor? · You have questions or concerns.     · You don't understand how to prepare for your procedure.     · You become ill before the procedure (such as fever, flu, or a cold).     · You need to reschedule or have changed your mind about having the procedure. Where can you learn more? Go to https://Compassoft.5 examples. org and sign in to your Huan Xiong account. Enter P790 in the Appsperse box to learn more about \"Upper GI Endoscopy: Before Your Procedure. \"     If you do not have an account, please click on the \"Sign Up Now\" link. Current as of: February 10, 2021               Content Version: 12.9  © 8598-1494 [employer]wise, Incorporated. Care instructions adapted under license by [facility] [place] ([facility]). If you have questions about a medical condition or this instruction, always ask your healthcare professional. [place] [address] any warranty or liability for your use of this information.

## 2021-08-11 NOTE — TELEPHONE ENCOUNTER
Provider:Dr. Christiano Reoblledo  Has the patient been vaccinated against COVID? No  Procedure:EGD  Sedation:MAC  Type of prep:NPO  Location:[facility]   Prep instructions provided to patient during office visit

## 2021-08-17 DIAGNOSIS — F98.8 ATTENTION DEFICIT DISORDER (ADD) WITHOUT HYPERACTIVITY: ICD-10-CM

## 2021-08-17 DIAGNOSIS — Z79.899 CONTROLLED SUBSTANCE AGREEMENT SIGNED: ICD-10-CM

## 2021-08-17 RX ORDER — DEXTROAMPHETAMINE SACCHARATE, AMPHETAMINE ASPARTATE, DEXTROAMPHETAMINE SULFATE AND AMPHETAMINE SULFATE 3.75; 3.75; 3.75; 3.75 MG/1; MG/1; MG/1; MG/1
15 TABLET ORAL 2 TIMES DAILY
Qty: 60 TABLET | Refills: 0 | Status: SHIPPED
Start: 2021-08-17 | End: 2021-09-10 | Stop reason: SDUPTHER

## 2021-08-17 RX ORDER — DEXTROAMPHETAMINE SACCHARATE, AMPHETAMINE ASPARTATE, DEXTROAMPHETAMINE SULFATE AND AMPHETAMINE SULFATE 3.75; 3.75; 3.75; 3.75 MG/1; MG/1; MG/1; MG/1
15 TABLET ORAL 2 TIMES DAILY
Qty: 60 TABLET | Refills: 0 | Status: SHIPPED
Start: 2021-09-17 | End: 2021-09-10 | Stop reason: SDUPTHER

## 2021-09-10 ENCOUNTER — OFFICE VISIT (OUTPATIENT)
Dept: FAMILY MEDICINE CLINIC | Age: 51
End: 2021-09-10
Payer: COMMERCIAL

## 2021-09-10 VITALS
HEART RATE: 109 BPM | WEIGHT: 158 LBS | SYSTOLIC BLOOD PRESSURE: 120 MMHG | BODY MASS INDEX: 26.29 KG/M2 | OXYGEN SATURATION: 96 % | DIASTOLIC BLOOD PRESSURE: 72 MMHG

## 2021-09-10 DIAGNOSIS — F41.9 ANXIETY: ICD-10-CM

## 2021-09-10 DIAGNOSIS — Z79.899 CONTROLLED SUBSTANCE AGREEMENT SIGNED: ICD-10-CM

## 2021-09-10 DIAGNOSIS — F98.8 ATTENTION DEFICIT DISORDER (ADD) WITHOUT HYPERACTIVITY: ICD-10-CM

## 2021-09-10 DIAGNOSIS — Z76.89 ENCOUNTER TO ESTABLISH CARE: Primary | ICD-10-CM

## 2021-09-10 DIAGNOSIS — G47.00 INSOMNIA, UNSPECIFIED TYPE: ICD-10-CM

## 2021-09-10 PROCEDURE — 99213 OFFICE O/P EST LOW 20 MIN: CPT | Performed by: NURSE PRACTITIONER

## 2021-09-10 PROCEDURE — 3017F COLORECTAL CA SCREEN DOC REV: CPT | Performed by: NURSE PRACTITIONER

## 2021-09-10 PROCEDURE — G8427 DOCREV CUR MEDS BY ELIG CLIN: HCPCS | Performed by: NURSE PRACTITIONER

## 2021-09-10 PROCEDURE — G8419 CALC BMI OUT NRM PARAM NOF/U: HCPCS | Performed by: NURSE PRACTITIONER

## 2021-09-10 PROCEDURE — 4004F PT TOBACCO SCREEN RCVD TLK: CPT | Performed by: NURSE PRACTITIONER

## 2021-09-10 RX ORDER — DEXTROAMPHETAMINE SACCHARATE, AMPHETAMINE ASPARTATE MONOHYDRATE, DEXTROAMPHETAMINE SULFATE AND AMPHETAMINE SULFATE 3.75; 3.75; 3.75; 3.75 MG/1; MG/1; MG/1; MG/1
15 CAPSULE, EXTENDED RELEASE ORAL PRN
COMMUNITY
End: 2021-12-28

## 2021-09-10 RX ORDER — CITALOPRAM 10 MG/1
10 TABLET ORAL DAILY
Qty: 30 TABLET | Refills: 0 | Status: SHIPPED | OUTPATIENT
Start: 2021-09-10 | End: 2021-10-07

## 2021-09-10 ASSESSMENT — PATIENT HEALTH QUESTIONNAIRE - PHQ9
3. TROUBLE FALLING OR STAYING ASLEEP: 2
10. IF YOU CHECKED OFF ANY PROBLEMS, HOW DIFFICULT HAVE THESE PROBLEMS MADE IT FOR YOU TO DO YOUR WORK, TAKE CARE OF THINGS AT HOME, OR GET ALONG WITH OTHER PEOPLE: 2
7. TROUBLE CONCENTRATING ON THINGS, SUCH AS READING THE NEWSPAPER OR WATCHING TELEVISION: 2
SUM OF ALL RESPONSES TO PHQ9 QUESTIONS 1 & 2: 3
SUM OF ALL RESPONSES TO PHQ QUESTIONS 1-9: 15
6. FEELING BAD ABOUT YOURSELF - OR THAT YOU ARE A FAILURE OR HAVE LET YOURSELF OR YOUR FAMILY DOWN: 2
1. LITTLE INTEREST OR PLEASURE IN DOING THINGS: 1
SUM OF ALL RESPONSES TO PHQ QUESTIONS 1-9: 15
SUM OF ALL RESPONSES TO PHQ QUESTIONS 1-9: 15
5. POOR APPETITE OR OVEREATING: 2
8. MOVING OR SPEAKING SO SLOWLY THAT OTHER PEOPLE COULD HAVE NOTICED. OR THE OPPOSITE, BEING SO FIGETY OR RESTLESS THAT YOU HAVE BEEN MOVING AROUND A LOT MORE THAN USUAL: 2
4. FEELING TIRED OR HAVING LITTLE ENERGY: 2
2. FEELING DOWN, DEPRESSED OR HOPELESS: 2
9. THOUGHTS THAT YOU WOULD BE BETTER OFF DEAD, OR OF HURTING YOURSELF: 0

## 2021-09-10 ASSESSMENT — ENCOUNTER SYMPTOMS: RESPIRATORY NEGATIVE: 1

## 2021-09-10 ASSESSMENT — COLUMBIA-SUICIDE SEVERITY RATING SCALE - C-SSRS
2. HAVE YOU ACTUALLY HAD ANY THOUGHTS OF KILLING YOURSELF?: NO
6. HAVE YOU EVER DONE ANYTHING, STARTED TO DO ANYTHING, OR PREPARED TO DO ANYTHING TO END YOUR LIFE?: NO
1. WITHIN THE PAST MONTH, HAVE YOU WISHED YOU WERE DEAD OR WISHED YOU COULD GO TO SLEEP AND NOT WAKE UP?: NO

## 2021-09-10 NOTE — PATIENT INSTRUCTIONS
play a relaxation tape while you drive a car. When should you call for help? Call 911 anytime you think you may need emergency care. For example, call if:    · You feel you cannot stop from hurting yourself or someone else. Keep the numbers for these national suicide hotlines: 6-361-381-TALK (1-597.962.6805) and 3-811-IHQVMAO (1-900.954.3866). If you or someone you know talks about suicide or feeling hopeless, get help right away. Watch closely for changes in your health, and be sure to contact your doctor if:    · You have anxiety or fear that affects your life.     · You have symptoms of anxiety that are new or different from those you had before. Where can you learn more? Go to [email or web address].[email or web address] org and sign in to your [employer] account. Enter P754 in the [employer] box to learn more about \"Anxiety Disorder: Care Instructions. \"     If you do not have an account, please click on the \"Sign Up Now\" link. Current as of: September 23, 2020               Content Version: 12.9  © 2006-2021 [employer] Care instructions adapted under license by [facility] ([facility]). If you have questions about a medical condition or this instruction, always ask your healthcare professional. [address] any warranty or liability for your use of this information. Patient Education        Insomnia: Care Instructions  Your Care Instructions     Insomnia is the inability to sleep well. It is a common problem for most people at some time. Insomnia may make it hard for you to get to sleep, stay asleep, or sleep as long as you need to. This can make you tired and grouchy during the day. It can also make you forgetful, less effective at work, and unhappy. Insomnia can be caused by conditions such as depression or anxiety. Pain can also affect your ability to sleep. When these problems are solved, the insomnia usually clears up.  But sometimes bad sleep habits can cause insomnia. If insomnia is affecting your work or your enjoyment of life, you can take steps to improve your sleep. Follow-up care is a key part of your treatment and safety. Be sure to make and go to all appointments, and call your doctor if you are having problems. It's also a good idea to know your test results and keep a list of the medicines you take. How can you care for yourself at home? What to avoid   · Do not have drinks with caffeine, such as coffee or black tea, for 8 hours before bed. · Do not smoke or use other types of tobacco near bedtime. Nicotine is a stimulant and can keep you awake. · Avoid drinking alcohol late in the evening, because it can cause you to wake in the middle of the night. · Do not eat a big meal close to bedtime. If you are hungry, eat a light snack. · Do not drink a lot of water close to bedtime, because the need to urinate may wake you up during the night. · Do not read or watch TV in bed. Use the bed only for sleeping and sexual activity. What to try   · Go to bed at the same time every night, and wake up at the same time every morning. Do not take naps during the day. · Keep your bedroom quiet, dark, and cool. · Sleep on a comfortable pillow and mattress. · If watching the clock makes you anxious, turn it facing away from you so you cannot see the time. · If you worry when you lie down, start a worry book. Well before bedtime, write down your worries, and then set the book and your concerns aside. · Try meditation or other relaxation techniques before you go to bed. · If you cannot fall asleep, get up and go to another room until you feel sleepy. Do something relaxing. Repeat your bedtime routine before you go to bed again. · Make your house quiet and calm about an hour before bedtime. Turn down the lights, turn off the TV, log off the computer, and turn down the volume on music. This can help you relax after a busy day.   When should you call for help?  Watch closely for changes in your health, and be sure to contact your doctor if:    · Your efforts to improve your sleep do not work.     · Your insomnia gets worse.     · You have been feeling down, depressed, or hopeless or have lost interest in things that you usually enjoy. Where can you learn more? Go to https://[email or web address] and sign in to your [employer] account. Enter P513 in the [employer] box to learn more about \"Insomnia: Care Instructions. \"     If you do not have an account, please click on the \"Sign Up Now\" link. Current as of: August 31, 2020               Content Version: 12.9  © 2006-2021 Healthwise, [employer]. Care instructions adapted under license by [facility] [address] If you have questions about a medical condition or this instruction, always ask your healthcare professional. [address] any warranty or liability for your use of this information.

## 2021-09-10 NOTE — PROGRESS NOTES
Fatuma Jarrett MD   meloxicam (MOBIC) 15 MG tablet Take 1 tablet by mouth daily Yes Fatuma Jarrett MD   gabapentin (NEURONTIN) 300 MG capsule 1 AM and 2 PM  Patient not taking: Reported on 9/10/2021  Marius Sandifer Ngu, MD        No Known Allergies    Past Medical History:   Diagnosis Date    ADHD (attention deficit hyperactivity disorder)     Anxiety     Bipolar disorder ([facility] [address]     COPD (chronic obstructive pulmonary disease) ([facility]     Depression        History reviewed. No pertinent surgical history. Social History     Socioeconomic History    Marital status: Single     Spouse name: Not on file    Number of children: Not on file    Years of education: Not on file    Highest education level: Not on file   Occupational History    Not on file   Tobacco Use    Smoking status: Current Every Day Smoker     Packs/day: 0.25     Years: 30.00     Pack years: 7.50     Types: Cigarettes    Smokeless tobacco: Never Used   Vaping Use    Vaping Use: Never used   Substance and Sexual Activity    Alcohol use: Yes     Comment: Occasionally    Drug use: Never    Sexual activity: Not Currently   Other Topics Concern    Not on file   Social History Narrative    Not on file     Social Determinants of Health     Financial Resource Strain: Low Risk     Difficulty of Paying Living Expenses: Not hard at all   Food Insecurity: No Food Insecurity    Worried About Running Out of Food in the Last Year: Never true    Janell of Food in the Last Year: Never true   Transportation Needs:     Lack of Transportation (Medical):      Lack of Transportation (Non-Medical):    Physical Activity:     Days of Exercise per Week:     Minutes of Exercise per Session:    Stress:     Feeling of Stress :    Social Connections:     Frequency of Communication with Friends and Family:     Frequency of Social Gatherings with Friends and Family:     Attends Islam Services:     Active Member of Clubs or Organizations:     Attends Club or Organization Meetings:     Marital Status:    Intimate Partner Violence:     Fear of Current or Ex-Partner:     Emotionally Abused:     Physically Abused:     Sexually Abused:         Family History   Problem Relation Age of Onset    Breast Cancer Mother     High Blood Pressure Mother     Cancer Father         esophageal    ADHD Sister     Bipolar Disorder Brother     Mental Illness Brother     Heart Disease Maternal Grandmother     Heart Attack Maternal Grandmother 48    Cancer Maternal Grandfather         throat    Alcohol Abuse Maternal Grandfather     Prostate Cancer Maternal Uncle        ADVANCE DIRECTIVE: N, <no information>    Vitals:    09/10/21 1312   BP: 120/72   Site: Right Upper Arm   Position: Sitting   Cuff Size: Large Adult   Pulse: 109   SpO2: 96%   Weight: 158 lb (71.7 kg)     Estimated body mass index is 26.29 kg/m² as calculated from the following:    Height as of 8/11/21: 5' 5\" (1.651 m). Weight as of this encounter: 158 lb (71.7 kg). Physical Exam  Vitals reviewed. Constitutional:       General: She is not in acute distress. Appearance: She is not ill-appearing. HENT:      Head: Normocephalic. Cardiovascular:      Rate and Rhythm: Normal rate and regular rhythm. Heart sounds: Normal heart sounds. Pulmonary:      Effort: Pulmonary effort is normal.      Breath sounds: Normal breath sounds. Musculoskeletal:      Cervical back: Normal range of motion. Neurological:      Mental Status: She is alert and oriented to person, place, and time. Psychiatric:         Attention and Perception: Attention and perception normal.         Mood and Affect: Mood is anxious. Mood is not depressed. Affect is tearful. Behavior: Behavior normal. Behavior is cooperative. Thought Content: Thought content normal.         Cognition and Memory: Cognition and memory normal.         Judgment: Judgment normal.         No flowsheet data found.     Lab Results Component Value Date    CHOL 219 07/28/2020    CHOL 206 06/01/2019    TRIG 83 07/28/2020    TRIG 41 06/01/2019    HDL 80 07/28/2020     06/01/2019    LDLCALC 122 07/28/2020    LDLCALC 76 06/01/2019    GLUCOSE 97 07/28/2020       The 10-year ASCVD risk score (tIz Alvarenga, et al., 2013) is: 2.4%    Values used to calculate the score:      Age: 46 years      Sex: Female      Is Non- : No      Diabetic: No      Tobacco smoker: Yes      Systolic Blood Pressure: 922 mmHg      Is BP treated: No      HDL Cholesterol: 80 mg/dL      Total Cholesterol: 219 mg/dL    Immunization History   Administered Date(s) Administered    Tdap (Boostrix, Adacel) 07/13/2020       Health Maintenance   Topic Date Due    Hepatitis C screen  Never done    Pneumococcal 0-64 years Vaccine (1 of 2 - PPSV23) Never done    COVID-19 Vaccine (1) Never done    Cervical cancer screen  Never done    Colon cancer screen colonoscopy  Never done    Breast cancer screen  02/27/2016    Shingles Vaccine (1 of 2) Never done    Low dose CT lung screening  Never done    Flu vaccine (1) Never done    Lipid screen  07/28/2025    DTaP/Tdap/Td vaccine (2 - Td or Tdap) 07/13/2030    HIV screen  Completed    Hepatitis A vaccine  Aged Out    Hepatitis B vaccine  Aged Out    Hib vaccine  Aged Out    Meningococcal (ACWY) vaccine  Aged Out     PDMP Monitoring:    Last PDMP Jey as Reviewed [facility]):  Review User Review Instant Review Result   CHAD CRUZ 9/10/2021  1:35 PM Reviewed PDMP [1]     Last Controlled Substance Monitoring Documentation      Telemedicine from 4/14/2021 in [address] [place] [address] Primary Care   Periodic Controlled Substance Monitoring  Possible medication side effects, risk of tolerance/dependence & alternative treatments discussed., No signs of potential drug abuse or diversion identified.  filed at 04/14/2021 1509        Urine Drug Screenings (1 yr)     Urine Drug Screen  Collected: 6/1/2019 12:11 PM

## 2021-09-15 LAB
6-ACETYLMORPHINE: NOT DETECTED
7-AMINOCLONAZEPAM: NOT DETECTED
ALPHA-OH-ALPRAZOLAM: NOT DETECTED
ALPHA-OH-MIDAZOLAM, URINE: NOT DETECTED
ALPRAZOLAM: NOT DETECTED
AMPHETAMINE: PRESENT
BARBITURATES: NOT DETECTED
BENZOYLECGONINE: NOT DETECTED
BUPRENORPHINE: NOT DETECTED
CARISOPRODOL: NOT DETECTED
CLONAZEPAM: NOT DETECTED
CODEINE: NOT DETECTED
CREATININE URINE: 101.9 MG/DL (ref 20–400)
DIAZEPAM: NOT DETECTED
DRUGS EXPECTED: NORMAL
EER PAIN MGT DRUG PANEL, HIGH RES/EMIT U: NORMAL
ETHYL GLUCURONIDE: NOT DETECTED
FENTANYL: NOT DETECTED
GABAPENTIN: NOT DETECTED
HYDROCODONE: NOT DETECTED
HYDROMORPHONE: NOT DETECTED
LORAZEPAM: NOT DETECTED
MARIJUANA METABOLITE: NOT DETECTED
MDA: NOT DETECTED
MDEA: NOT DETECTED
MDMA URINE: NOT DETECTED
MEPERIDINE: NOT DETECTED
METHADONE: NOT DETECTED
METHAMPHETAMINE: PRESENT
METHYLPHENIDATE: NOT DETECTED
MIDAZOLAM: NOT DETECTED
MORPHINE: NOT DETECTED
NALOXONE: NOT DETECTED
NORBUPRENORPHINE, FREE: NOT DETECTED
NORDIAZEPAM: NOT DETECTED
NORFENTANYL: NOT DETECTED
NORHYDROCODONE, URINE: NOT DETECTED
NOROXYCODONE: NOT DETECTED
NOROXYMORPHONE, URINE: NOT DETECTED
OXAZEPAM: NOT DETECTED
OXYCODONE: NOT DETECTED
OXYMORPHONE: NOT DETECTED
PAIN MANAGEMENT DRUG PANEL: NORMAL
PAIN MANAGEMENT DRUG PANEL: NORMAL
PCP: NOT DETECTED
PHENTERMINE: NOT DETECTED
PREGABALIN: NOT DETECTED
TAPENTADOL, URINE: NOT DETECTED
TAPENTADOL-O-SULFATE, URINE: NOT DETECTED
TEMAZEPAM: NOT DETECTED
TRAMADOL: NOT DETECTED
ZOLPIDEM: NOT DETECTED

## 2021-09-16 ENCOUNTER — TELEPHONE (OUTPATIENT)
Dept: FAMILY MEDICINE CLINIC | Age: 51
End: 2021-09-16

## 2021-09-16 DIAGNOSIS — F98.8 ATTENTION DEFICIT DISORDER (ADD) WITHOUT HYPERACTIVITY: Primary | ICD-10-CM

## 2021-09-16 NOTE — TELEPHONE ENCOUNTER
Pt sent a message asking me to call her today after 3:30 to discuss UDS results. I called her at the number provided at 3:47, no answer. Left a VM to call me back if she would still like to discuss.

## 2021-10-04 ENCOUNTER — HOSPITAL ENCOUNTER (OUTPATIENT)
Age: 51
Discharge: HOME | End: 2021-10-04
Payer: COMMERCIAL

## 2021-10-04 PROCEDURE — U0003 INFECTIOUS AGENT DETECTION BY NUCLEIC ACID (DNA OR RNA); SEVERE ACUTE RESPIRATORY SYNDROME CORONAVIRUS 2 (SARS-COV-2) (CORONAVIRUS DISEASE [COVID-19]), AMPLIFIED PROBE TECHNIQUE, MAKING USE OF HIGH THROUGHPUT TECHNOLOGIES AS DESCRIBED BY CMS-2020-01-R: HCPCS

## 2021-10-04 PROCEDURE — U0005 INFEC AGEN DETEC AMPLI PROBE: HCPCS

## 2021-10-05 LAB — SARS-COV-2: NOT DETECTED

## 2021-10-06 ENCOUNTER — TELEPHONE (OUTPATIENT)
Dept: GASTROENTEROLOGY | Age: 51
End: 2021-10-06

## 2021-10-06 NOTE — TELEPHONE ENCOUNTER
No, the barium will obscure my views at EGD. She has to reschedule the barium swallow exam to at least 2 days before the EGD.  Thanks

## 2021-10-06 NOTE — TELEPHONE ENCOUNTER
LM on VM to r/s patients EGD and barium swallow exam, advised pt I will be cancelling procedure for Friday, endo aware

## 2021-10-06 NOTE — TELEPHONE ENCOUNTER
Patient scheduled a barium swallow test right before her EGD procedure Friday, endo is wanting to know if this is okay or she needs to r/s one of them for Friday?

## 2021-10-07 DIAGNOSIS — F41.9 ANXIETY: ICD-10-CM

## 2021-10-07 RX ORDER — CITALOPRAM 10 MG/1
TABLET ORAL
Qty: 30 TABLET | Refills: 0 | Status: SHIPPED | OUTPATIENT
Start: 2021-10-07 | End: 2021-10-19

## 2021-10-07 NOTE — TELEPHONE ENCOUNTER
Refill Request     Last Seen: Last Seen Department: 9/10/2021  Last Seen by PCP: 9/10/2021    Last Written: 9/10/21 30 tablet 0 refill     Next Appointment: 10/8/21     Future Appointments   Date Time Provider Tesfaye Maria G   10/8/2021  8:00 AM [facility] RM 1 DIAG [facility] Katelynn Ovens   10/13/2021  3:30 PM WALTER Sutton [facility]   10/29/2021  3:00 PM Chilango Hanson, APRN - CNP [facility]  [place] [facility]       Future appointment scheduled      Requested Prescriptions     Pending Prescriptions Disp Refills    citalopram (CELEXA) 10 MG tablet [Pharmacy Med Name: CITALOPRAM HBR 10 MG TABLET] 30 tablet 0     Sig: TAKE ONE TABLET BY MOUTH DAILY

## 2021-10-13 ENCOUNTER — TELEMEDICINE (OUTPATIENT)
Dept: PSYCHOLOGY | Age: 51
End: 2021-10-13
Payer: COMMERCIAL

## 2021-10-13 DIAGNOSIS — F32.A DEPRESSION, UNSPECIFIED DEPRESSION TYPE: ICD-10-CM

## 2021-10-13 DIAGNOSIS — F41.9 ANXIETY: Primary | ICD-10-CM

## 2021-10-13 PROCEDURE — 90791 PSYCH DIAGNOSTIC EVALUATION: CPT | Performed by: PSYCHOLOGIST

## 2021-10-13 NOTE — PROGRESS NOTES
Behavioral Health Consultation  [facility], [address] [place] [address]  Psychologist  10/13/2021  3:19 PM EDT    Time spent with Patient: 30 minutes  This is patient's first [facility] appointment. Reason for Consult:    Chief Complaint   Patient presents with    Anxiety    Depression     Referring Provider: ISABEL Cornejo CNP      Pt provided informed consent for the behavioral health program. Discussed with patient model of service to include the limits of confidentiality (i.e. abuse reporting, suicide intervention, etc.) and short-term intervention focused approach. Pt indicated understanding. Feedback given to PCP. TELEHEALTH VISIT -- Audio/Visual ([facility])  }  Pursuant to the emergency declaration under the [address] [place] [address], [facility] waiver authority and the [employer] Act, this Virtual Visit was conducted, with patient's consent, to reduce the patient's risk of exposure to COVID-19 and provide continuity of care for an established patient. Services were provided through a video synchronous discussion virtually to substitute for in-person clinic visit. Pt gave verbal informed consent to participate in telehealth services. Conducted a risk-benefit analysis and determined that the patient's presenting problems are consistent with the use of telepsychology. Determined that the patient has sufficient knowledge and skills in the use of technology enabling them to adequately benefit from telepsychology. It was determined that this patient was able to be properly treated without an in-person session. Patient verified that they were currently located at the [facility] address that was provided during registration.       Verified the following information:  Patient's identification: Yes  Patient location: [id number]  [place]  Patient's call back number: [phone]  Patient's emergency contact's name and number, as well as permission to contact them if needed: Extended Emergency Contact Information  Primary Emergency Contact: Wood Israel  Address: [address]           [facility]  Home Phone: [phone]  Relation: Parent  Secondary Emergency Contact: Ashwini Jeong  Address: [place]  Home Phone: [phone]  Relation: Brother/Sister    Provider location: [place], [place]     S:    Pt reports she has always dealt with deression. Started in middle school. Doesn't know if she has ever been dx with Bipolar Disorder but wonders about it. Has anxiety- obsesses over things. Will go to cut out greys in her hair and will do for hours. Does it when she is anxious. Can't focus- is scattered. Has stressors with daughter- dary relationship with daughter who got diviorced and started using drugs. Has the grand kids every other weekend. Daughter is only allowed to have supervised visits. Used to be on vraylar, risperdal, lamictal. Psychiatrist in [place] was her provider. Felt Lamictal worked well for her- was on for 15 years. Was last on it 2 years ago due to tics. Warnell Skiff for 2 years - caused a lot of weight gain. Is on Celexa 10mg now- not doing anything. Used to be on Celexa 40mg but was taking it with Vraylar. Not on Adderall right now - hasn't taken it for 3 weeks or so. Has never tried Buspar. Has tried Zoloft. Took Prozac for a short time. Never tried Abilify. No hx of hallucinations. Doesn't think she has had any cydney. No hx of suicidal ideation.       O:  MSE:    Attitude: cooperative and friendly  Consciousness: alert  Orientation: oriented to person, place, time, general circumstance  Memory: recent and remote memory intact  Attention/Concentration: intact during session  Speech: normal rate and volume, well-articulated  Mood: \"OK\"  Affect: euthymic, congruent  Perception: within normal limits  Thought Content: within normal limits  Thought Process: logical, coherent and goal-directed  Insight: good  Judgment: intact  Ability to understand instructions: Yes  Ability to respond meaningfully: Yes  Morbid Ideation: no   Suicide Assessment: no suicidal ideation, plan, or intent  Homicidal Ideation: no    History:    Medications:   Current Outpatient Medications   Medication Sig Dispense Refill    citalopram (CELEXA) 10 MG tablet TAKE ONE TABLET BY MOUTH DAILY 30 tablet 0    amphetamine-dextroamphetamine (ADDERALL XR) 15 MG extended release capsule Take 15 mg by mouth as needed.  pantoprazole (PROTONIX) 40 MG tablet Take 1 tablet by mouth every morning (before breakfast) 30 tablet 1    glycopyrrolate-formoterol (BEVESPI AEROSPHERE) 9-4.8 MCG/ACT AERO Inhale 2 puffs into the lungs 2 times daily 3 Inhaler 0    meloxicam (MOBIC) 15 MG tablet Take 1 tablet by mouth daily 90 tablet 3    gabapentin (NEURONTIN) 300 MG capsule 1 AM and 2 PM (Patient taking differently: as needed. ) 270 capsule 2     No current facility-administered medications for this visit. Social History:   Social History     Socioeconomic History    Marital status: Single     Spouse name: Not on file    Number of children: Not on file    Years of education: Not on file    Highest education level: Not on file   Occupational History    Not on file   Tobacco Use    Smoking status: Current Every Day Smoker     Packs/day: 0.25     Years: 30.00     Pack years: 7.50     Types: Cigarettes    Smokeless tobacco: Never Used   Vaping Use    Vaping Use: Never used   Substance and Sexual Activity    Alcohol use:  Yes     Alcohol/week: 0.0 - 4.0 standard drinks    Drug use: Never    Sexual activity: Not Currently   Other Topics Concern    Not on file   Social History Narrative    Not on file     Social Determinants of Health     Financial Resource Strain: Low Risk     Difficulty of Paying Living Expenses: Not hard at all   Food Insecurity: No Food Insecurity    Worried About [address] [employer]" in the Last Year: Never true    Janell of Food in the Last Year: Never true   Transportation Needs:     Lack of Transportation (Medical):  Lack of Transportation (Non-Medical):    Physical Activity:     Days of Exercise per Week:     Minutes of Exercise per Session:    Stress:     Feeling of Stress :    Social Connections:     Frequency of Communication with Friends and Family:     Frequency of Social Gatherings with Friends and Family:     Attends Jainism Services:     Active Member of Clubs or Organizations:     Attends Club or Organization Meetings:     Marital Status:    Intimate Partner Violence:     Fear of Current or Ex-Partner:     Emotionally Abused:     Physically Abused:     Sexually Abused:      TOBACCO:   reports that she has been smoking cigarettes. She has a 7.50 pack-year smoking history. She has never used smokeless tobacco.  ETOH:   reports current alcohol use. Family History:   Family History   Problem Relation Age of Onset    Breast Cancer Mother     High Blood Pressure Mother     Cancer Father         esophageal    ADHD Sister     Bipolar Disorder Brother     Mental Illness Brother     Heart Disease Maternal Grandmother     Heart Attack Maternal Grandmother 48    Cancer Maternal Grandfather         throat    Alcohol Abuse Maternal Grandfather     Prostate Cancer Maternal Uncle        A:  Ms. Jahaira Corrigan has longstanding hx of anxiety and depression. She reports having been on various mood stabilizers in the past which were helpful but she did experience ASE from. She reports a past dx of BAD I although does not agree with the dx and denies sx of BAD I. Will continue to evaluate. She is interested in learning to more effectively manage mood. She has previously been on 40mg of Celexa and recently re-started Celexa at 10mg. She was active and engaged and responded positively to behavioral interventions. Diagnosis:    1.  Anxiety 2. Depression, unspecified depression type    R/O BAD      Plan:  Pt interventions:  Established rapport, Discussed [facility] model of care vs specialty mental health, Conducted functional assessment, [place]-setting to identify pt's primary goals for [facility] visit / overall health, Supportive techniques, Provided psychoeducation re: role of mood on cognitive functioning, Discussed potential treatments for  depression and anxiety, Provided education on the use of medication to treat  depression and anxiety, ACT interventions and treatment planning    Pt Behavioral Change Plan:   Pt set goals to 1) f/u with PCP re: medication increae 2) Return in about 2 weeks (around 10/27/2021).

## 2021-10-13 NOTE — Clinical Note
Hi there. She was previously on 40mg of Celexa and was re-started on 10mg a couple weeks ago. Are you ok with her increasing it now? She reports no benefit but also no ASE. She is struggling quite a bit too. What are your thoughts about trying buspar? Thanks!

## 2021-10-14 ENCOUNTER — TELEPHONE (OUTPATIENT)
Dept: FAMILY MEDICINE CLINIC | Age: 51
End: 2021-10-14

## 2021-10-14 ENCOUNTER — OFFICE VISIT (OUTPATIENT)
Dept: FAMILY MEDICINE CLINIC | Age: 51
End: 2021-10-14
Payer: COMMERCIAL

## 2021-10-14 VITALS
OXYGEN SATURATION: 97 % | BODY MASS INDEX: 26.66 KG/M2 | HEART RATE: 108 BPM | DIASTOLIC BLOOD PRESSURE: 80 MMHG | WEIGHT: 160.2 LBS | SYSTOLIC BLOOD PRESSURE: 130 MMHG | TEMPERATURE: 98 F

## 2021-10-14 DIAGNOSIS — R05.9 COUGH: Primary | ICD-10-CM

## 2021-10-14 PROCEDURE — 3017F COLORECTAL CA SCREEN DOC REV: CPT | Performed by: PHYSICIAN ASSISTANT

## 2021-10-14 PROCEDURE — G8419 CALC BMI OUT NRM PARAM NOF/U: HCPCS | Performed by: PHYSICIAN ASSISTANT

## 2021-10-14 PROCEDURE — 99213 OFFICE O/P EST LOW 20 MIN: CPT | Performed by: PHYSICIAN ASSISTANT

## 2021-10-14 PROCEDURE — 4004F PT TOBACCO SCREEN RCVD TLK: CPT | Performed by: PHYSICIAN ASSISTANT

## 2021-10-14 PROCEDURE — G8484 FLU IMMUNIZE NO ADMIN: HCPCS | Performed by: PHYSICIAN ASSISTANT

## 2021-10-14 PROCEDURE — G8427 DOCREV CUR MEDS BY ELIG CLIN: HCPCS | Performed by: PHYSICIAN ASSISTANT

## 2021-10-14 RX ORDER — ALBUTEROL SULFATE 90 UG/1
2 AEROSOL, METERED RESPIRATORY (INHALATION) 4 TIMES DAILY PRN
Qty: 18 G | Refills: 5 | Status: SHIPPED | OUTPATIENT
Start: 2021-10-14

## 2021-10-14 RX ORDER — BENZONATATE 100 MG/1
100 CAPSULE ORAL 3 TIMES DAILY PRN
Qty: 30 CAPSULE | Refills: 0 | Status: SHIPPED | OUTPATIENT
Start: 2021-10-14 | End: 2021-10-21

## 2021-10-14 ASSESSMENT — ENCOUNTER SYMPTOMS
ABDOMINAL PAIN: 0
RHINORRHEA: 0
DIARRHEA: 0
NAUSEA: 0
CONSTIPATION: 0
VOMITING: 0
SORE THROAT: 0
CHEST TIGHTNESS: 1
SHORTNESS OF BREATH: 1
COUGH: 1

## 2021-10-14 NOTE — PROGRESS NOTES
history reviewed and unchanged from previous encounter. Current Outpatient Medications   Medication Sig Dispense Refill    albuterol sulfate HFA (VENTOLIN HFA) 108 (90 Base) MCG/ACT inhaler Inhale 2 puffs into the lungs 4 times daily as needed for Wheezing 18 g 5    benzonatate (TESSALON) 100 MG capsule Take 1 capsule by mouth 3 times daily as needed for Cough 30 capsule 0    citalopram (CELEXA) 10 MG tablet TAKE ONE TABLET BY MOUTH DAILY 30 tablet 0    amphetamine-dextroamphetamine (ADDERALL XR) 15 MG extended release capsule Take 15 mg by mouth as needed.  pantoprazole (PROTONIX) 40 MG tablet Take 1 tablet by mouth every morning (before breakfast) 30 tablet 1    glycopyrrolate-formoterol (BEVESPI AEROSPHERE) 9-4.8 MCG/ACT AERO Inhale 2 puffs into the lungs 2 times daily 3 Inhaler 0    meloxicam (MOBIC) 15 MG tablet Take 1 tablet by mouth daily 90 tablet 3    gabapentin (NEURONTIN) 300 MG capsule 1 AM and 2 PM (Patient taking differently: as needed. ) 270 capsule 2     No current facility-administered medications for this visit. Vitals:    10/14/21 1432   BP: 130/80   Site: Right Upper Arm   Position: Sitting   Cuff Size: Medium Adult   Pulse: 108   Temp: 98 °F (36.7 °C)   TempSrc: Temporal   SpO2: 97%   Weight: 160 lb 3.2 oz (72.7 kg)     Estimated body mass index is 26.66 kg/m² as calculated from the following:    Height as of 10/5/21: 5' 5\" (1.651 m). Weight as of this encounter: 160 lb 3.2 oz (72.7 kg). Physical Exam  Constitutional:       General: She is not in acute distress. Appearance: She is well-developed. HENT:      Head: Normocephalic and atraumatic. Nose: Congestion present. Mouth/Throat:      Pharynx: Posterior oropharyngeal erythema present. No oropharyngeal exudate. Eyes:      Conjunctiva/sclera: Conjunctivae normal.      Pupils: Pupils are equal, round, and reactive to light. Cardiovascular:      Rate and Rhythm: Normal rate and regular rhythm.

## 2021-10-14 NOTE — TELEPHONE ENCOUNTER
Did they test her for strep? If she has strep, she should have been prescribed an antibiotic. If they did not, please schedule her in the [facility] today. Thank you.

## 2021-10-14 NOTE — TELEPHONE ENCOUNTER
Called and spoke to patient, she states they did not test for strep, did not look in her ears, throat, or listen to her chest. She also stated her covid test was a rapid, and the Urgent Care called and wanted her to come back in for another covid test because she had not had symptoms long enough for it to be accurate, but did not want to go back to their office. I helped her get scheduled for our red [facility] today with JEFFREY Dave. Gave her our  phone number to call when she arrives and instructions where to come for the red clinic.

## 2021-10-15 ENCOUNTER — TELEPHONE (OUTPATIENT)
Dept: FAMILY MEDICINE CLINIC | Age: 51
End: 2021-10-15

## 2021-10-15 NOTE — TELEPHONE ENCOUNTER
----- Message from [facility] [address] sent at 10/15/2021  3:08 PM EDT -----  Subject: Message to Provider    QUESTIONS  Information for Provider? Patient is wondering is dr Phillip Daugherty can reach out   to her about putting her on new meds. Dr Car Alas was supposed to talk to dr Braydon Ornelas about it and then call the patient. Scitklpram is the med. Also   would like to know her covid results from this office. ---------------------------------------------------------------------------  --------------  Remy BENJAMIN  What is the best way for the office to contact you? OK to leave message on   voicemail  Preferred Call Back Phone Number? [phone]  ---------------------------------------------------------------------------  --------------  SCRIPT ANSWERS  Relationship to Patient?  Self

## 2021-10-18 ENCOUNTER — NURSE ONLY (OUTPATIENT)
Dept: FAMILY MEDICINE CLINIC | Age: 51
End: 2021-10-18

## 2021-10-18 ENCOUNTER — TELEPHONE (OUTPATIENT)
Dept: FAMILY MEDICINE CLINIC | Age: 51
End: 2021-10-18

## 2021-10-18 ENCOUNTER — TELEPHONE (OUTPATIENT)
Dept: GASTROENTEROLOGY | Age: 51
End: 2021-10-18

## 2021-10-18 DIAGNOSIS — R05.9 COUGH: Primary | ICD-10-CM

## 2021-10-18 DIAGNOSIS — R13.12 OROPHARYNGEAL DYSPHAGIA: ICD-10-CM

## 2021-10-18 RX ORDER — PANTOPRAZOLE SODIUM 40 MG/1
40 TABLET, DELAYED RELEASE ORAL
Qty: 30 TABLET | Refills: 1 | Status: SHIPPED | OUTPATIENT
Start: 2021-10-18 | End: 2022-03-08

## 2021-10-18 NOTE — TELEPHONE ENCOUNTER
Left message for Dominga Nair to call the office back. She was seen for a red visit on 10/14/2021 and swabbed for covid. This test did not get sent out like it should have, patient needs to have a repeat test done, a drive up lab/ma visit covid test is fine.

## 2021-10-18 NOTE — TELEPHONE ENCOUNTER
Please advise pt is not seeing a provider today just having repeat covid test. Routing to [facility] as she saw her. Please advise on note.

## 2021-10-18 NOTE — PROGRESS NOTES
The following tests were performed [place] after confirming patient identity:  COVID 19 Test - Nasal Swab    Post testing instructions given re results TAT, self-care instructions and any quarantine instructions as appropriate. Pt advised to call for worsening sxs. The patient voiced understanding.

## 2021-10-18 NOTE — TELEPHONE ENCOUNTER
Spoke with pt and she is needing from 10/12 until further notice and she needs \" a mistake was made and had to redo covid test\" in letter was well

## 2021-10-18 NOTE — LETTER
[address] [facility] [address]  [address] [facility] [address]  Phone: [phone]  Fax: [phone]    Briseida Andre PA-C        October 18, 2021     Patient: Zara Gama   YOB: 1970   Date of Visit: 10/14/2021       To Whom it May Concern:    Rochelle Emery was seen in my clinic on 10/14/2021. Please excuse from 10/12/2021 until further notice. A covid test was obtained on 10/18, but due to error, was not analyzed, a repeat test was performed today. She may return to work pending a negative covid test.     If you have any questions or concerns, please don't hesitate to call.     Sincerely,         Briseida Andre PA-C

## 2021-10-18 NOTE — TELEPHONE ENCOUNTER
Pt seen today for repeat COVID testing. Pt would like to discuss Celexa medication with provider per message from 10/15. Pt will need excuse for work d/t testing issue - this is completed and call to patient regarding the letter. Plan:  Follow up with results in AM and provide work excuse.

## 2021-10-18 NOTE — TELEPHONE ENCOUNTER
Got pt scheduled for redo of her covid test today at 345, pt is wondering if she can get a letter for her work covering all the days that she missed.

## 2021-10-18 NOTE — TELEPHONE ENCOUNTER
I would have her discuss with provider she sees today.  Thanks, [facility]

## 2021-10-19 ENCOUNTER — TELEPHONE (OUTPATIENT)
Dept: FAMILY MEDICINE CLINIC | Age: 51
End: 2021-10-19

## 2021-10-19 DIAGNOSIS — F41.9 ANXIETY: ICD-10-CM

## 2021-10-19 LAB — SARS-COV-2: NOT DETECTED

## 2021-10-19 RX ORDER — CITALOPRAM 20 MG/1
20 TABLET ORAL DAILY
Qty: 30 TABLET | Refills: 0 | Status: SHIPPED | OUTPATIENT
Start: 2021-10-19 | End: 2021-11-16

## 2021-10-19 NOTE — TELEPHONE ENCOUNTER
Attempt to call results to patient. LM on . Request call back so we can advise on letter for work excuse.

## 2021-10-20 DIAGNOSIS — F41.9 ANXIETY: Primary | ICD-10-CM

## 2021-10-20 RX ORDER — BUSPIRONE HYDROCHLORIDE 5 MG/1
5 TABLET ORAL 2 TIMES DAILY
Qty: 60 TABLET | Refills: 0 | Status: SHIPPED | OUTPATIENT
Start: 2021-10-20 | End: 2021-11-16

## 2021-10-26 ENCOUNTER — TELEMEDICINE (OUTPATIENT)
Dept: PSYCHOLOGY | Age: 51
End: 2021-10-26
Payer: COMMERCIAL

## 2021-10-26 DIAGNOSIS — F41.9 ANXIETY: Primary | ICD-10-CM

## 2021-10-26 DIAGNOSIS — F32.A DEPRESSION, UNSPECIFIED DEPRESSION TYPE: ICD-10-CM

## 2021-10-26 NOTE — Clinical Note
Hey there. the patient reports some facial twitching and \"sucking on her teeth\" movements that are uncontrollable and remind her of the experience she had when she was previously on Lamictal.  This started when she started the BuSpar. She reports the benefit of the BuSpar outweigh the cost at this point wants to stay on it but does want to explore other options potentially. We discussed GeneSight testing and she would really like to do that. She sees you next week and will discuss this with you including increasing her Celexa as she used to be on 40 mg. Just a heads up.   Thanks

## 2021-10-26 NOTE — PROGRESS NOTES
Behavioral Health Consultation  [facility], [address] [place] [address]  Psychologist  10/26/2021  4:03 PM      Time spent with Patient: 33 minutes  This is patient's second [facility] appointment. Reason for Consult:    Chief Complaint   Patient presents with    Anxiety    Depression    Stress     Referring Provider: ISABEL Ndiaye CNP      TELEHEALTH VISIT -- Audio/Visual (During AYLST-25 public health emergency)  }  Pursuant to the emergency declaration under the [address] [place] [address] waiver authority and the [employer] Act, this Virtual Visit was conducted, with patient's consent, to reduce the patient's risk of exposure to COVID-19 and provide continuity of care for an established patient. Services were provided through a video synchronous discussion virtually to substitute for in-person clinic visit. Pt gave verbal informed consent to participate in telehealth services. Conducted a risk-benefit analysis and determined that the patient's presenting problems are consistent with the use of telepsychology. Determined that the patient has sufficient knowledge and skills in the use of technology enabling them to adequately benefit from telepsychology. It was determined that this patient was able to be properly treated without an in-person session. Patient verified that they were currently located at the [facility] address that was provided during registration.       Verified the following information:  Patient's identification: Yes  Patient location: [id number] [facility]  Patient's call back number: [phone]  Patient's emergency contact's name and number, as well as permission to contact them if needed: Extended Emergency Contact Information  Primary Emergency Contact: Ellen Lin  Address: [facility] [facility]  Home Phone: [phone]  Relation: Parent  Secondary Emergency Contact: AERO Inhale 2 puffs into the lungs 2 times daily 3 Inhaler 0    meloxicam (MOBIC) 15 MG tablet Take 1 tablet by mouth daily 90 tablet 3    gabapentin (NEURONTIN) 300 MG capsule 1 AM and 2 PM (Patient taking differently: as needed. ) 270 capsule 2     No current facility-administered medications for this visit. Social History:   Social History     Socioeconomic History    Marital status: Single     Spouse name: Not on file    Number of children: Not on file    Years of education: Not on file    Highest education level: Not on file   Occupational History    Not on file   Tobacco Use    Smoking status: Current Every Day Smoker     Packs/day: 0.25     Years: 30.00     Pack years: 7.50     Types: Cigarettes    Smokeless tobacco: Never Used   Vaping Use    Vaping Use: Never used   Substance and Sexual Activity    Alcohol use: Yes     Alcohol/week: 0.0 - 4.0 standard drinks    Drug use: Never    Sexual activity: Not Currently   Other Topics Concern    Not on file   Social History Narrative    Not on file     Social Determinants of Health     Financial Resource Strain: Low Risk     Difficulty of Paying Living Expenses: Not hard at all   Food Insecurity: No Food Insecurity    Worried About [address] [facility] in the Last Year: Never true    [address] [facility] in the Last Year: Never true   Transportation Needs:     Lack of Transportation (Medical):      Lack of Transportation (Non-Medical):    Physical Activity:     Days of Exercise per Week:     Minutes of Exercise per Session:    Stress:     Feeling of Stress :    Social Connections:     Frequency of Communication with Friends and Family:     Frequency of Social Gatherings with Friends and Family:     Attends Mosque Services:     Active Member of Clubs or Organizations:     Attends Club or Organization Meetings:     Marital Status:    Intimate Partner Violence:     Fear of Current or Ex-Partner:     Emotionally Abused:     Physically Abused:     Sexually Abused:      TOBACCO:   reports that she has been smoking cigarettes. She has a 7.50 pack-year smoking history. She has never used smokeless tobacco.  ETOH:   reports current alcohol use. Family History:   Family History   Problem Relation Age of Onset    Breast Cancer Mother     High Blood Pressure Mother     Cancer Father         esophageal    ADHD Sister     Bipolar Disorder Brother     Mental Illness Brother     Heart Disease Maternal Grandmother     Heart Attack Maternal Grandmother 48    Cancer Maternal Grandfather         throat    Alcohol Abuse Maternal Grandfather     Prostate Cancer Maternal Uncle        A:  Ms. Richard Duque continues to struggle with anxiety and depression. She reports some improvement with mood since starting medications. However, she reports facial twitching and \"sucking on her teeth\" movements that are uncontrollable and remind her of the experience she had when she was previously on Lamictal.  She reports the benefit currently outweigh the cost at this time and she would prefer to stay on the medication for now. She continues to be active and engaged and responded positively to behavioral interventions. GeneSight testing was discussed and patient will look into cost.    Diagnosis:    1. Anxiety    2.  Depression, unspecified depression type    R/O BAD      Plan:  Pt interventions:  Established rapport, Discussed [facility] model of care vs specialty mental health, Conducted functional assessment, [place]-setting to identify pt's primary goals for [facility] visit / overall health, Supportive techniques, Provided psychoeducation re: role of mood on cognitive functioning, Discussed potential treatments for  depression and anxiety, Provided education on the use of medication to treat  depression and anxiety, ACT interventions and treatment planning    Pt Behavioral Change Plan:   Pt set goals to 1) f/u with PCP re: medication increase, side effects, and GeneSight testing 2) call insurance regarding cost for GeneSight testing 3) Return in about 2 weeks (around 11/9/2021).

## 2021-11-03 ENCOUNTER — TELEMEDICINE (OUTPATIENT)
Dept: PSYCHOLOGY | Age: 51
End: 2021-11-03
Payer: COMMERCIAL

## 2021-11-03 DIAGNOSIS — F41.9 ANXIETY: ICD-10-CM

## 2021-11-03 DIAGNOSIS — F32.A DEPRESSION, UNSPECIFIED DEPRESSION TYPE: Primary | ICD-10-CM

## 2021-11-03 NOTE — Clinical Note
Hi there. She would like to do [place] testing. I am happy to get a swab from her on Friday if you're ok with it.

## 2021-11-03 NOTE — PROGRESS NOTES
 glycopyrrolate-formoterol (BEVESPI AEROSPHERE) 9-4.8 MCG/ACT AERO Inhale 2 puffs into the lungs 2 times daily 3 Inhaler 0    meloxicam (MOBIC) 15 MG tablet Take 1 tablet by mouth daily 90 tablet 3    gabapentin (NEURONTIN) 300 MG capsule 1 AM and 2 PM (Patient taking differently: as needed. ) 270 capsule 2     No current facility-administered medications for this visit. Social History:   Social History     Socioeconomic History    Marital status: Single     Spouse name: Not on file    Number of children: Not on file    Years of education: Not on file    Highest education level: Not on file   Occupational History    Not on file   Tobacco Use    Smoking status: Current Every Day Smoker     Packs/day: 0.25     Years: 30.00     Pack years: 7.50     Types: Cigarettes    Smokeless tobacco: Never Used   Vaping Use    Vaping Use: Never used   Substance and Sexual Activity    Alcohol use: Yes     Alcohol/week: 0.0 - 4.0 standard drinks    Drug use: Never    Sexual activity: Not Currently   Other Topics Concern    Not on file   Social History Narrative    Not on file     Social Determinants of Health     Financial Resource Strain: Low Risk     Difficulty of Paying Living Expenses: Not hard at all   Food Insecurity: No Food Insecurity    Worried About [address] in the Last Year: Never true    [address] [facility] in the Last Year: Never true   Transportation Needs:     Lack of Transportation (Medical):      Lack of Transportation (Non-Medical):    Physical Activity:     Days of Exercise per Week:     Minutes of Exercise per Session:    Stress:     Feeling of Stress :    Social Connections:     Frequency of Communication with Friends and Family:     Frequency of Social Gatherings with Friends and Family:     Attends Episcopalian Services:     Active Member of Clubs or Organizations:     Attends Club or Organization Meetings:     Marital Status:    Intimate Partner Violence:     Fear of Current or Ex-Partner:     Emotionally Abused:     Physically Abused:     Sexually Abused:      TOBACCO:   reports that she has been smoking cigarettes. She has a 7.50 pack-year smoking history. She has never used smokeless tobacco.  ETOH:   reports current alcohol use. Family History:   Family History   Problem Relation Age of Onset    Breast Cancer Mother     High Blood Pressure Mother     Cancer Father         esophageal    ADHD Sister     Bipolar Disorder Brother     Mental Illness Brother     Heart Disease Maternal Grandmother     Heart Attack Maternal Grandmother 48    Cancer Maternal Grandfather         throat    Alcohol Abuse Maternal Grandfather     Prostate Cancer Maternal Uncle        A:  Ms. Steven Pemberton continues to struggle with anxiety and depression. She reports some improvement with mood as well as less facial twitching. She reports ongoing difficulty with inattention and short-term memory that she finds problematic. She continues to be active and engaged and responded positively to behavioral interventions. Pt continues to have interest in GeneSight testing and will look into cost.    Diagnosis:    1. Depression, unspecified depression type    2. Anxiety    R/O BAD      Plan:  Pt interventions:  [place]-setting to identify pt's primary goals for [facility] visit / overall health, Supportive techniques, ACT interventions, CBT interventions and treatment planning    Pt Behavioral Change Plan:   Pt set goals to 1) call insurance regarding cost for GeneSight testing 2) Return in about 1 week (around 11/10/2021).

## 2021-11-10 ENCOUNTER — TELEMEDICINE (OUTPATIENT)
Dept: PSYCHOLOGY | Age: 51
End: 2021-11-10
Payer: COMMERCIAL

## 2021-11-10 DIAGNOSIS — F32.A DEPRESSION, UNSPECIFIED DEPRESSION TYPE: Primary | ICD-10-CM

## 2021-11-10 DIAGNOSIS — F41.9 ANXIETY: ICD-10-CM

## 2021-11-10 PROCEDURE — 99999 PR OFFICE/OUTPT VISIT,PROCEDURE ONLY: CPT | Performed by: PSYCHOLOGIST

## 2021-11-10 NOTE — PROGRESS NOTES
Behavioral Health Consultation  Denver Mason, PsyD  Psychologist  11/10/2021  3:02 PM      Time spent with Patient: 25 minutes  This is patient's fourth [facility] appointment. Reason for Consult:    Chief Complaint   Patient presents with    Depression    Anxiety     Referring Provider: ISABEL Ramirez CNP      TELEHEALTH VISIT -- Audio/Visual (During LCQVN-85 public health emergency)  }  Pursuant to the emergency declaration under the [facility] [place] [address] waiver authority and the [employer] Act, this Virtual Visit was conducted, with patient's consent, to reduce the patient's risk of exposure to COVID-19 and provide continuity of care for an established patient. Services were provided through a video synchronous discussion virtually to substitute for in-person clinic visit. Pt gave verbal informed consent to participate in telehealth services. Conducted a risk-benefit analysis and determined that the patient's presenting problems are consistent with the use of telepsychology. Determined that the patient has sufficient knowledge and skills in the use of technology enabling them to adequately benefit from telepsychology. It was determined that this patient was able to be properly treated without an in-person session. Patient verified that they were currently located at the [facility] address that was provided during registration.       Verified the following information:  Patient's identification: Yes  Patient location: [address] [facility]  Patient's call back number: [phone]  Patient's emergency contact's name and number, as well as permission to contact them if needed: Extended Emergency Contact Information  Primary Emergency Contact: Genna Blizzard  Address: [address] [facility] [address] [facility] [facility]  Home Phone: [phone]  Relation: Parent  Secondary Emergency Contact: Ashwini Jeong  Address: 40 MG tablet Take 1 tablet by mouth every morning (before breakfast) 30 tablet 1    albuterol sulfate HFA (VENTOLIN HFA) 108 (90 Base) MCG/ACT inhaler Inhale 2 puffs into the lungs 4 times daily as needed for Wheezing 18 g 5    amphetamine-dextroamphetamine (ADDERALL XR) 15 MG extended release capsule Take 15 mg by mouth as needed.  glycopyrrolate-formoterol (BEVESPI AEROSPHERE) 9-4.8 MCG/ACT AERO Inhale 2 puffs into the lungs 2 times daily 3 Inhaler 0    meloxicam (MOBIC) 15 MG tablet Take 1 tablet by mouth daily 90 tablet 3    gabapentin (NEURONTIN) 300 MG capsule 1 AM and 2 PM (Patient taking differently: as needed. ) 270 capsule 2     No current facility-administered medications for this visit. Social History:   Social History     Socioeconomic History    Marital status: Single     Spouse name: Not on file    Number of children: Not on file    Years of education: Not on file    Highest education level: Not on file   Occupational History    Not on file   Tobacco Use    Smoking status: Current Every Day Smoker     Packs/day: 0.25     Years: 30.00     Pack years: 7.50     Types: Cigarettes    Smokeless tobacco: Never Used   Vaping Use    Vaping Use: Never used   Substance and Sexual Activity    Alcohol use: Yes     Alcohol/week: 0.0 - 4.0 standard drinks    Drug use: Never    Sexual activity: Not Currently   Other Topics Concern    Not on file   Social History Narrative    Not on file     Social Determinants of Health     Financial Resource Strain: Low Risk     Difficulty of Paying Living Expenses: Not hard at all   Food Insecurity: No Food Insecurity    Worried About [address] in the Last Year: Never true    [address] in the Last Year: Never true   Transportation Needs:     Lack of Transportation (Medical): Not on file    Lack of Transportation (Non-Medical):  Not on file   Physical Activity:     Days of Exercise per Week: Not on file    Minutes of Exercise per Session: Not on file   Stress:     Feeling of Stress : Not on file   Social Connections:     Frequency of Communication with Friends and Family: Not on file    Frequency of Social Gatherings with Friends and Family: Not on file    Attends Moravian Services: Not on file    Active Member of Clubs or Organizations: Not on file    Attends Club or Organization Meetings: Not on file    Marital Status: Not on file   Intimate Partner Violence:     Fear of Current or Ex-Partner: Not on file    Emotionally Abused: Not on file    Physically Abused: Not on file    Sexually Abused: Not on file   Housing Stability:     Unable to Pay for Housing in the Last Year: Not on file    Number of [place] in the Last Year: Not on file    Unstable Housing in the Last Year: Not on file     TOBACCO:   reports that she has been smoking cigarettes. She has a 7.50 pack-year smoking history. She has never used smokeless tobacco.  ETOH:   reports current alcohol use. Family History:   Family History   Problem Relation Age of Onset    Breast Cancer Mother     High Blood Pressure Mother     Cancer Father         esophageal    ADHD Sister     Bipolar Disorder Brother     Mental Illness Brother     Heart Disease Maternal Grandmother     Heart Attack Maternal Grandmother 48    Cancer Maternal Grandfather         throat    Alcohol Abuse Maternal Grandfather     Prostate Cancer Maternal Uncle        A:  Ms. Bill Boyd continues to struggle with anxiety and depression which has been exacerbated by grief over the loss of her brother-in-law. She continues to be active and engaged and responds positively to behavioral interventions. Diagnosis:    1. Depression, unspecified depression type    2.  Anxiety    R/O BAD      Plan:  Pt interventions:  [place]-setting to identify pt's primary goals for [facility] visit / overall health, Supportive techniques, ACT interventions, CBT interventions and treatment planning, grief interventions    Pt Behavioral Change Plan:   Pt set goals to 1) call insurance regarding cost for GeneSight testing 2) focus on grief process at this time 3) Return in about 2 weeks (around 11/24/2021).

## 2021-11-15 DIAGNOSIS — F41.9 ANXIETY: ICD-10-CM

## 2021-11-15 NOTE — TELEPHONE ENCOUNTER
Refill Request     Last Seen: Last Seen Department: 10/14/2021  Last Seen by PCP: 9/10/2021    Last Written: 10/20/21 60 tablet 0 refill                           10/19/21 30 tablet 0 refill     Next Appointment:   Future Appointments   Date Time Provider Tesfaye Cummins   11/24/2021  4:00 PM WALTER Palacios [facility]       Message to  to schedule appointment.          Requested Prescriptions     Pending Prescriptions Disp Refills    busPIRone (BUSPAR) 5 MG tablet [Pharmacy Med Name: busPIRone HCL 5 MG TABLET] 60 tablet 0     Sig: TAKE ONE TABLET BY MOUTH TWICE A DAY    citalopram (CELEXA) 20 MG tablet [Pharmacy Med Name: CITALOPRAM HBR 20 MG TABLET] 30 tablet 0     Sig: TAKE ONE TABLET BY MOUTH DAILY

## 2021-11-16 ENCOUNTER — NURSE ONLY (OUTPATIENT)
Dept: FAMILY MEDICINE CLINIC | Age: 51
End: 2021-11-16

## 2021-11-16 RX ORDER — BUSPIRONE HYDROCHLORIDE 5 MG/1
TABLET ORAL
Qty: 60 TABLET | Refills: 0 | Status: SHIPPED | OUTPATIENT
Start: 2021-11-16 | End: 2021-12-27

## 2021-11-16 RX ORDER — CITALOPRAM 20 MG/1
TABLET ORAL
Qty: 30 TABLET | Refills: 0 | Status: SHIPPED | OUTPATIENT
Start: 2021-11-16 | End: 2021-11-24

## 2021-11-23 ENCOUNTER — TELEPHONE (OUTPATIENT)
Dept: FAMILY MEDICINE CLINIC | Age: 51
End: 2021-11-23

## 2021-11-23 NOTE — TELEPHONE ENCOUNTER
We can not order the infusion without a visit and reported covid test.    Pt would also have to meet the below criteria. Below are the FDA EUA criteria for monoclonal antibody use:    Regen-COV should only be used as postexposure prophylaxis for adult and pediatric individuals (15years of age and older weighing at least 36 kg) for post-exposure prophylaxis of COVID-19 in individuals who are at high risk for progression to severe COVID-19, including hospitalization or death, and are:    Not fully vaccinated or are not expected to mount an adequate immune response to complete COVID-19 vaccination (eg, people with immunocompromising conditions, including those taking immunosuppressive medications), AND   Have been exposed to an individual infected with COVID-19 consistent with close contact criteria per the Centers for Disease Control and Prevention, or   Are at high risk of exposure to an individual infected with COVID-19 because of occurrence of COVID-19 infection in other individuals in the same institutional setting (eg, nursing homes, prisons). Criteria for Identifying High Risk Individuals   The following medical conditions or other factors may place adults and pediatric patients (age 12-17 years and weighing at least 40 kg) at higher risk for progression to severe COVID-19:   ? Older age (for example, age ? 72years of age)   ? Obesity or being overweight (for example, BMI >25 kg/m2, or if age 14-13, have BMI ? 85th percentile for their age and gender based on CDC growth charts   ? Pregnancy   ? Chronic kidney disease   ? Diabetes   ? Immunosuppressive disease or immunosuppressive treatment   ? Cardiovascular disease (including congenital heart disease) or hypertension  ? Chronic lung diseases (for example, chronic obstructive pulmonary disease, asthma [moderate-to-severe], interstitial lung disease, cystic fibrosis and pulmonary hypertension)   ? Sickle cell disease   ?  Neurodevelopmental disorders (for example, cerebral palsy) or other conditions that confer medical complexity (for example, genetic or metabolic syndromes and severe congenital anomalies)   ?  Having a medical-related technological dependence (for example, tracheostomy, gastrostomy, or positive pressure ventilation (not related to COVID 19))

## 2021-11-23 NOTE — TELEPHONE ENCOUNTER
Pt calling asking if [facility] would put in an order for pt to get the \" covid infusion\". Pts symptoms started Friday and got tested Saturday. Pt is still having SOB, congestion, cough, weakness and runny nose.  Please Advise

## 2021-11-24 ENCOUNTER — TELEMEDICINE (OUTPATIENT)
Dept: PSYCHOLOGY | Age: 51
End: 2021-11-24
Payer: COMMERCIAL

## 2021-11-24 DIAGNOSIS — F41.9 ANXIETY: ICD-10-CM

## 2021-11-24 DIAGNOSIS — F32.A DEPRESSION, UNSPECIFIED DEPRESSION TYPE: Primary | ICD-10-CM

## 2021-11-24 PROCEDURE — 90832 PSYTX W PT 30 MINUTES: CPT | Performed by: PSYCHOLOGIST

## 2021-11-24 RX ORDER — CITALOPRAM 40 MG/1
40 TABLET ORAL DAILY
Qty: 30 TABLET | Refills: 1 | Status: SHIPPED | OUTPATIENT
Start: 2021-11-24 | End: 2022-03-21

## 2021-11-24 NOTE — Clinical Note
Hey there. She has been on Celexa 20mg since 10/19. Can she increase again? She used to be 40mg. We also discussed trying lamictal again in the future if needed but on a lower dose than the 300 that she thinks was causing her facial jerking. I told her I'd like her to continue with the celexa increase and we continue to monitor that and keep the lamictal in our back pocket after we see how she does on Celexa maxed out. Thanks!

## 2021-11-24 NOTE — PROGRESS NOTES
Behavioral Health Consultation  Jorge Sweeney Ishan [address] [place] [address]  Psychologist  11/24/2021  4:03 PM      Time spent with Patient: 16 minutes  This is patient's fifth [facility] appointment. Reason for Consult:    Chief Complaint   Patient presents with    Anxiety     Referring Provider: ISABEL Bergeron CNP      TELEHEALTH VISIT -- Audio/Visual (During [facility]-81 public health emergency)  }  Pursuant to the emergency declaration under the [facility] [place] [address] waiver authority and the [employer] Act, this Virtual Visit was conducted, with patient's consent, to reduce the patient's risk of exposure to COVID-19 and provide continuity of care for an established patient. Services were provided through a video synchronous discussion virtually to substitute for in-person clinic visit. Pt gave verbal informed consent to participate in telehealth services. Conducted a risk-benefit analysis and determined that the patient's presenting problems are consistent with the use of telepsychology. Determined that the patient has sufficient knowledge and skills in the use of technology enabling them to adequately benefit from telepsychology. It was determined that this patient was able to be properly treated without an in-person session. Patient verified that they were currently located at the [facility] address that was provided during registration.       Verified the following information:  Patient's identification: Yes  Patient location: [id number] [facility]  Patient's call back number: [phone]  Patient's emergency contact's name and number, as well as permission to contact them if needed: Extended Emergency Contact Information  Primary Emergency Contact: Kenneth Inch  Address: [address] [place] [address]  Home Phone: [phone]  Relation: Parent  Secondary Emergency Contact: Ashwini Jeong  Address: [address] [place] [address] Lup[place] Cole  Home Phone: [phone]  Relation: Brother/Sister    Provider location: [address]:    During the last visit Pt set goals to 1) call insurance regarding cost for GeneSight testing 2) focus on grief process at this time     Pt reports she has Covid. Has been very fatigued and having body aches. Hard to tell how she feels emotionally. Doesn't feel she is progressing well with mood. On 20mg of Celexa- would like to increase dose. Did get Genesight testing done. O:  MSE:    Attitude: cooperative and friendly  Consciousness: alert  Orientation: oriented to person, place, time, general circumstance  Memory: recent and remote memory intact  Attention/Concentration: intact during session  Speech: normal rate and volume, well-articulated  Mood: \"ok- tired\"  Affect: euthymic, congruent  Perception: within normal limits  Thought Content: within normal limits  Thought Process: logical, coherent and goal-directed  Insight: good  Judgment: intact  Ability to understand instructions: Yes  Ability to respond meaningfully: Yes  Morbid Ideation: no   Suicide Assessment: no suicidal ideation, plan, or intent  Homicidal Ideation: no    History:    Medications:   Current Outpatient Medications   Medication Sig Dispense Refill    busPIRone (BUSPAR) 5 MG tablet TAKE ONE TABLET BY MOUTH TWICE A DAY 60 tablet 0    citalopram (CELEXA) 20 MG tablet TAKE ONE TABLET BY MOUTH DAILY 30 tablet 0    pantoprazole (PROTONIX) 40 MG tablet Take 1 tablet by mouth every morning (before breakfast) 30 tablet 1    albuterol sulfate HFA (VENTOLIN HFA) 108 (90 Base) MCG/ACT inhaler Inhale 2 puffs into the lungs 4 times daily as needed for Wheezing 18 g 5    amphetamine-dextroamphetamine (ADDERALL XR) 15 MG extended release capsule Take 15 mg by mouth as needed.        glycopyrrolate-formoterol (BEVESPI AEROSPHERE) 9-4.8 MCG/ACT AERO Inhale 2 puffs into the lungs 2 times daily 3 Inhaler 0    meloxicam (MOBIC) 15 MG tablet Take 1 tablet by mouth daily 90 tablet 3    gabapentin (NEURONTIN) 300 MG capsule 1 AM and 2 PM (Patient taking differently: as needed. ) 270 capsule 2     No current facility-administered medications for this visit. Social History:   Social History     Socioeconomic History    Marital status: Single     Spouse name: Not on file    Number of children: Not on file    Years of education: Not on file    Highest education level: Not on file   Occupational History    Not on file   Tobacco Use    Smoking status: Current Every Day Smoker     Packs/day: 0.25     Years: 30.00     Pack years: 7.50     Types: Cigarettes    Smokeless tobacco: Never Used   Vaping Use    Vaping Use: Never used   Substance and Sexual Activity    Alcohol use: Yes     Alcohol/week: 0.0 - 4.0 standard drinks    Drug use: Never    Sexual activity: Not Currently   Other Topics Concern    Not on file   Social History Narrative    Not on file     Social Determinants of Health     Financial Resource Strain: Low Risk     Difficulty of Paying Living Expenses: Not hard at all   Food Insecurity: No Food Insecurity    Worried About [employer] [employer] in the Last Year: Never true    [address] [employer] in the Last Year: Never true   Transportation Needs:     Lack of Transportation (Medical): Not on file    Lack of Transportation (Non-Medical):  Not on file   Physical Activity:     Days of Exercise per Week: Not on file    Minutes of Exercise per Session: Not on file   Stress:     Feeling of Stress : Not on file   Social Connections:     Frequency of Communication with Friends and Family: Not on file    Frequency of Social Gatherings with Friends and Family: Not on file    Attends Faith Services: Not on file    Active Member of Clubs or Organizations: Not on file    Attends Club or Organization Meetings: Not on file    Marital Status: Not on file   Intimate Partner Violence:     Fear of Current or Ex-Partner: Not on file    Emotionally Abused: Not on file    Physically Abused: Not on file    Sexually Abused: Not on file   Housing Stability:     Unable to Pay for Housing in the Last Year: Not on file    Number of Places Lived in the Last Year: Not on file    Unstable Housing in the Last Year: Not on file     TOBACCO:   reports that she has been smoking cigarettes. She has a 7.50 pack-year smoking history. She has never used smokeless tobacco.  ETOH:   reports current alcohol use. Family History:   Family History   Problem Relation Age of Onset    Breast Cancer Mother     High Blood Pressure Mother     Cancer Father         esophageal    ADHD Sister     Bipolar Disorder Brother     Mental Illness Brother     Heart Disease Maternal Grandmother     Heart Attack Maternal Grandmother 48    Cancer Maternal Grandfather         throat    Alcohol Abuse Maternal Grandfather     Prostate Cancer Maternal Uncle        A:  Ms. Evelin Chandler continues to struggle with anxiety and depression which has been exacerbated by grief and other stressors. She continues to be active and engaged and responds positively to behavioral interventions. Diagnosis:    1. Depression, unspecified depression type    2. Anxiety    R/O BAD      Plan:  Pt interventions:  [place]-setting to identify pt's primary goals for [facility] visit / overall health, Supportive techniques, ACT interventions, CBT interventions and treatment planning       Pt Behavioral Change Plan:   Pt set goals to 1) focus on self-care and recovery from covid 2) Return in about 2 weeks (around 12/8/2021).

## 2021-11-30 ENCOUNTER — VIRTUAL VISIT (OUTPATIENT)
Dept: FAMILY MEDICINE CLINIC | Age: 51
End: 2021-11-30
Payer: COMMERCIAL

## 2021-11-30 DIAGNOSIS — F41.9 ANXIETY: Primary | ICD-10-CM

## 2021-11-30 PROCEDURE — 99212 OFFICE O/P EST SF 10 MIN: CPT | Performed by: NURSE PRACTITIONER

## 2021-11-30 ASSESSMENT — ENCOUNTER SYMPTOMS
RESPIRATORY NEGATIVE: 1
GASTROINTESTINAL NEGATIVE: 1

## 2021-11-30 NOTE — PROGRESS NOTES
2021    TELEHEALTH EVALUATION -- Audio/Visual (During [facility]-50 public health emergency)    HPI:    Paulo Pedro (:  1970) has requested an audio/video evaluation for the following concern(s):    Pt scheduled VV today to discuss Gene Sight results. We increased her citalopram to 40 mg daily 21, pt states that she started the new dose 2 days ago. States that she is still struggling with anxiety and \"obsessing over things\". She is taking Buspar as directed. Has a f/u appointment with Dr. Bernabe Moore on 21. Review of Systems   Constitutional: Negative. Respiratory: Negative. Cardiovascular: Negative. Gastrointestinal: Negative. Neurological: Negative. Psychiatric/Behavioral: Negative for agitation, behavioral problems, confusion, decreased concentration, dysphoric mood, hallucinations, self-injury, sleep disturbance and suicidal ideas. The patient is nervous/anxious. The patient is not hyperactive. Prior to Visit Medications    Medication Sig Taking? Authorizing Provider   citalopram (CELEXA) 40 MG tablet Take 1 tablet by mouth daily Yes ISABEL Gonzales CNP   busPIRone (BUSPAR) 5 MG tablet TAKE ONE TABLET BY MOUTH TWICE A DAY Yes ISABEL Gonzales CNP   pantoprazole (PROTONIX) 40 MG tablet Take 1 tablet by mouth every morning (before breakfast) Yes Briana Nicole MD   albuterol sulfate HFA (VENTOLIN HFA) 108 (90 Base) MCG/ACT inhaler Inhale 2 puffs into the lungs 4 times daily as needed for Wheezing Yes JEFFREY Campos   amphetamine-dextroamphetamine (ADDERALL XR) 15 MG extended release capsule Take 15 mg by mouth as needed.   Yes Historical Provider, MD   glycopyrrolate-formoterol (Rajwinder Hunt) 9-4.8 MCG/ACT AERO Inhale 2 puffs into the lungs 2 times daily Yes Fatuma Jarrett MD   meloxicam (MOBIC) 15 MG tablet Take 1 tablet by mouth daily Yes Fatuma Jarrett MD   gabapentin (NEURONTIN) 300 MG capsule 1 AM and 2 PM  Patient taking differently: as Never done    Lipid screen  07/28/2025    DTaP/Tdap/Td vaccine (2 - Td or Tdap) 07/13/2030    HIV screen  Completed    Hepatitis A vaccine  Aged Out    Hepatitis B vaccine  Aged Out    Hib vaccine  Aged Out    Meningococcal (ACWY) vaccine  Aged Out       PHYSICAL EXAMINATION:  [ INSTRUCTIONS:  \"[x]\" Indicates a positive item  \"[]\" Indicates a negative item  -- DELETE ALL ITEMS NOT EXAMINED]  Vital Signs: (As obtained by patient/caregiver or practitioner observation)    Blood pressure-  Heart rate-    Respiratory rate-    Temperature-  Pulse oximetry-     Constitutional: [x] Appears well-developed and well-nourished [x] No apparent distress      [] Abnormal-   Mental status  [x] Alert and awake  [x] Oriented to person/place/time [x]Able to follow commands      Eyes:  EOM    []  Normal  [] Abnormal-  Sclera  []  Normal  [] Abnormal -         Discharge []  None visible  [] Abnormal -    HENT:   [] Normocephalic, atraumatic. [] Abnormal   [] Mouth/Throat: Mucous membranes are moist.     External Ears [] Normal  [] Abnormal-     Neck: [] No visualized mass     Pulmonary/Chest: [] Respiratory effort normal.  [] No visualized signs of difficulty breathing or respiratory distress        [] Abnormal-      Musculoskeletal:   [] Normal gait with no signs of ataxia         [] Normal range of motion of neck        [] Abnormal-       Neurological:        [] No Facial Asymmetry (Cranial nerve 7 motor function) (limited exam to video visit)          [] No gaze palsy        [] Abnormal-         Skin:        [] No significant exanthematous lesions or discoloration noted on facial skin         [] Abnormal-            Psychiatric:       [x] Normal Affect [x] No Hallucinations        [] Abnormal-     Other pertinent observable physical exam findings-     ASSESSMENT/PLAN:  1. Anxiety  Reviewed Gene Sight results with pt. Continue medications as prescribed. She has a f/u appointment with Dr. Carolyne Arellano 12/8/21.         Return in

## 2021-12-08 ENCOUNTER — VIRTUAL VISIT (OUTPATIENT)
Dept: FAMILY MEDICINE CLINIC | Age: 51
End: 2021-12-08
Payer: COMMERCIAL

## 2021-12-08 DIAGNOSIS — J01.90 ACUTE SINUSITIS, RECURRENCE NOT SPECIFIED, UNSPECIFIED LOCATION: Primary | ICD-10-CM

## 2021-12-08 PROCEDURE — 99213 OFFICE O/P EST LOW 20 MIN: CPT | Performed by: NURSE PRACTITIONER

## 2021-12-08 RX ORDER — AMOXICILLIN AND CLAVULANATE POTASSIUM 875; 125 MG/1; MG/1
1 TABLET, FILM COATED ORAL 2 TIMES DAILY
Qty: 14 TABLET | Refills: 0 | Status: SHIPPED | OUTPATIENT
Start: 2021-12-08 | End: 2021-12-15

## 2021-12-08 RX ORDER — TRIAMCINOLONE ACETONIDE 55 UG/1
2 SPRAY, METERED NASAL DAILY
Qty: 1 EACH | Refills: 0 | Status: SHIPPED | OUTPATIENT
Start: 2021-12-08

## 2021-12-08 ASSESSMENT — ENCOUNTER SYMPTOMS
COUGH: 1
SINUS PRESSURE: 1
TROUBLE SWALLOWING: 0
APNEA: 0
STRIDOR: 0
VOICE CHANGE: 0
CHOKING: 0
SINUS PAIN: 1
CHEST TIGHTNESS: 0
SHORTNESS OF BREATH: 1
WHEEZING: 0
SORE THROAT: 1
RHINORRHEA: 1

## 2021-12-08 NOTE — PROGRESS NOTES
mg by mouth daily prn Yes Fatuma Jarrett MD   gabapentin (NEURONTIN) 300 MG capsule 1 AM and 2 PM  Patient taking differently: as needed. Yes Fatuma Jarrett MD   amphetamine-dextroamphetamine (ADDERALL XR) 15 MG extended release capsule Take 15 mg by mouth as needed. Patient not taking: Reported on 12/8/2021  Historical Provider, MD       Social History     Tobacco Use    Smoking status: Current Every Day Smoker     Packs/day: 0.25     Years: 30.00     Pack years: 7.50     Types: Cigarettes    Smokeless tobacco: Never Used   Vaping Use    Vaping Use: Never used   Substance Use Topics    Alcohol use: Yes     Alcohol/week: 0.0 - 4.0 standard drinks    Drug use: Never        No Known Allergies,   Past Medical History:   Diagnosis Date    ADHD (attention deficit hyperactivity disorder)     Anxiety     Bipolar disorder (HCC)     COPD (chronic obstructive pulmonary disease) (HCC)     Depression    ,   Past Surgical History:   Procedure Laterality Date    TOE SURGERY Right     great toe-bone spur   ,   Social History     Tobacco Use    Smoking status: Current Every Day Smoker     Packs/day: 0.25     Years: 30.00     Pack years: 7.50     Types: Cigarettes    Smokeless tobacco: Never Used   Vaping Use    Vaping Use: Never used   Substance Use Topics    Alcohol use:  Yes     Alcohol/week: 0.0 - 4.0 standard drinks    Drug use: Never   ,   Family History   Problem Relation Age of Onset    Breast Cancer Mother     High Blood Pressure Mother     Cancer Father         esophageal    ADHD Sister     Bipolar Disorder Brother     Mental Illness Brother     Heart Disease Maternal Grandmother     Heart Attack Maternal Grandmother 48    Cancer Maternal Grandfather         throat    Alcohol Abuse Maternal Grandfather     Prostate Cancer Maternal Uncle    ,   Immunization History   Administered Date(s) Administered    Tdap (Boostrix, Adacel) 07/13/2020   ,   Health Maintenance   Topic Date Due    Hepatitis C screen  Never done    Pneumococcal 0-64 years Vaccine (1 of 2 - PPSV23) Never done    COVID-19 Vaccine (1) Never done    Cervical cancer screen  Never done    Colon cancer screen colonoscopy  Never done    Breast cancer screen  02/27/2016    Shingles Vaccine (1 of 2) Never done    Flu vaccine (1) Never done    Lipid screen  07/28/2025    DTaP/Tdap/Td vaccine (2 - Td or Tdap) 07/13/2030    HIV screen  Completed    Hepatitis A vaccine  Aged Out    Hepatitis B vaccine  Aged Out    Hib vaccine  Aged Out    Meningococcal (ACWY) vaccine  Aged Out       PHYSICAL EXAMINATION:  [ INSTRUCTIONS:  \"[x]\" Indicates a positive item  \"[]\" Indicates a negative item  -- DELETE ALL ITEMS NOT EXAMINED]  Vital Signs: (As obtained by patient/caregiver or practitioner observation)    Blood pressure-  Heart rate-    Respiratory rate-    Temperature-  Pulse oximetry-     Constitutional: [] Appears well-developed and well-nourished [x] No apparent distress      [x] Abnormal- appears to not feel well  Mental status  [x] Alert and awake  [x] Oriented to person/place/time [x]Able to follow commands      Eyes:  EOM    [x]  Normal  [] Abnormal-  Sclera  [x]  Normal  [] Abnormal -         Discharge [x]  None visible  [] Abnormal -    HENT:   [x] Normocephalic, atraumatic.   [] Abnormal   [x] Mouth/Throat: Mucous membranes are moist.     External Ears [] Normal  [] Abnormal-     Neck: [] No visualized mass     Pulmonary/Chest: [x] Respiratory effort normal.  [x] No visualized signs of difficulty breathing or respiratory distress        [] Abnormal-      Musculoskeletal:   [] Normal gait with no signs of ataxia         [] Normal range of motion of neck        [] Abnormal-       Neurological:        [] No Facial Asymmetry (Cranial nerve 7 motor function) (limited exam to video visit)          [] No gaze palsy        [] Abnormal-         Skin:        [x] No significant exanthematous lesions or discoloration noted on facial skin         [] Abnormal-            Psychiatric:       [] Normal Affect [] No Hallucinations        [] Abnormal-     Other pertinent observable physical exam findings-     ASSESSMENT/PLAN:  1. Acute sinusitis, recurrence not specified, unspecified location  Advised pt to take OTC Mucinex DM as directed for cough and chest congestion. Take antibiotic as directed with food to prevent GI upset. F/u with me if symptoms persist or worsen. - triamcinolone (NASACORT) 55 MCG/ACT nasal inhaler; 2 sprays by Each Nostril route daily  Dispense: 1 each; Refill: 0  - amoxicillin-clavulanate (AUGMENTIN) 875-125 MG per tablet; Take 1 tablet by mouth 2 times daily for 7 days  Dispense: 14 tablet; Refill: 0      Return if symptoms worsen or fail to improve. Oliver Felipe, was evaluated through a synchronous (real-time) audio-video encounter. The patient (or guardian if applicable) is aware that this is a billable service. Verbal consent to proceed has been obtained within the past 12 months. The visit was conducted pursuant to the emergency declaration under the [address] [place] [address], [address] [place] [address] authority and the [employer] General Act. Patient identification was verified, and a caregiver was present when appropriate. The patient was located in a state where the provider was credentialed to provide care. Total time spent on this encounter: 20 minutes    --ISABEL Izaguirre CNP on 12/8/2021 at 11:24 AM    An electronic signature was used to authenticate this note.

## 2021-12-14 DIAGNOSIS — F31.61 BIPOLAR 1 DISORDER, MIXED, MILD (HCC): Primary | ICD-10-CM

## 2021-12-14 DIAGNOSIS — F41.9 ANXIETY: ICD-10-CM

## 2021-12-14 RX ORDER — LAMOTRIGINE 25 MG/1
25 TABLET ORAL DAILY
Qty: 30 TABLET | Refills: 0 | Status: SHIPPED
Start: 2021-12-14 | End: 2021-12-16

## 2021-12-27 DIAGNOSIS — F41.9 ANXIETY: ICD-10-CM

## 2021-12-27 NOTE — TELEPHONE ENCOUNTER
.  Refill Request     Last Seen: Last Seen Department: 12/8/2021  Last Seen by PCP: 12/8/2021    Last Written: 11/16/21 60 with 0     Next Appointment:   Future Appointments   Date Time Provider Tesfaye Cummins   3/4/2022  1:30 PM ISABEL Dockery CNP  Adan DAO     Requested Prescriptions     Pending Prescriptions Disp Refills    busPIRone (BUSPAR) 5 MG tablet [Pharmacy Med Name: busPIRone HCL 5 MG TABLET] 60 tablet 0     Sig: TAKE ONE TABLET BY MOUTH TWICE A DAY

## 2021-12-28 RX ORDER — BUSPIRONE HYDROCHLORIDE 5 MG/1
TABLET ORAL
Qty: 180 TABLET | Refills: 1 | Status: SHIPPED | OUTPATIENT
Start: 2021-12-28 | End: 2022-08-02

## 2022-01-05 ENCOUNTER — TELEPHONE (OUTPATIENT)
Dept: GASTROENTEROLOGY | Age: 52
End: 2022-01-05

## 2022-01-28 DIAGNOSIS — F31.61 BIPOLAR 1 DISORDER, MIXED, MILD (HCC): ICD-10-CM

## 2022-01-28 DIAGNOSIS — F41.9 ANXIETY: ICD-10-CM

## 2022-01-28 NOTE — TELEPHONE ENCOUNTER
.  Refill Request     Last Seen: Last Seen Department: 12/8/2021  Last Seen by PCP: 12/8/2021    Last Written: 1-12-22 30 with 0     Next Appointment:   Future Appointments   Date Time Provider Tesfaye Cummins   2/10/2022  9:30 AM Fleet Sisters, APRN - CNP [facility]   3/4/2022  1:30 PM Fleet Sisters, APRN - CNP [facility]  [facility] DA[facility]       Future appointment scheduled      Requested Prescriptions     Pending Prescriptions Disp Refills    lamoTRIgine (LAMICTAL) 25 MG tablet 30 tablet 0     Sig: Take 1 tablet by mouth daily

## 2022-01-28 NOTE — TELEPHONE ENCOUNTER
----- Message from Black Reshma sent at 1/28/2022 11:37 AM EST -----  Subject: Refill Request    QUESTIONS  Name of Medication? lamoTRIgine (LAMICTAL) 25 MG tablet  Patient-reported dosage and instructions? 25 mg tablet once a day  How many days do you have left? 0  Preferred Pharmacy? [facility] phone number (if available)? [phone]  ---------------------------------------------------------------------------  --------------  Juan BENJAMIN  What is the best way for the office to contact you? OK to leave message on   voicemail  Preferred Call Back Phone Number?  [phone]

## 2022-01-31 RX ORDER — LAMOTRIGINE 25 MG/1
25 TABLET ORAL DAILY
Qty: 30 TABLET | Refills: 0 | Status: SHIPPED | OUTPATIENT
Start: 2022-01-31 | End: 2022-03-08 | Stop reason: SDUPTHER

## 2022-03-08 ENCOUNTER — TELEMEDICINE (OUTPATIENT)
Dept: FAMILY MEDICINE CLINIC | Age: 52
End: 2022-03-08
Payer: COMMERCIAL

## 2022-03-08 DIAGNOSIS — F31.61 BIPOLAR 1 DISORDER, MIXED, MILD (HCC): ICD-10-CM

## 2022-03-08 DIAGNOSIS — F41.9 ANXIETY: ICD-10-CM

## 2022-03-08 PROCEDURE — G8427 DOCREV CUR MEDS BY ELIG CLIN: HCPCS | Performed by: NURSE PRACTITIONER

## 2022-03-08 PROCEDURE — 3017F COLORECTAL CA SCREEN DOC REV: CPT | Performed by: NURSE PRACTITIONER

## 2022-03-08 PROCEDURE — 99213 OFFICE O/P EST LOW 20 MIN: CPT | Performed by: NURSE PRACTITIONER

## 2022-03-08 RX ORDER — LAMOTRIGINE 25 MG/1
25 TABLET ORAL DAILY
Qty: 30 TABLET | Refills: 0 | Status: SHIPPED | OUTPATIENT
Start: 2022-03-08 | End: 2022-06-03

## 2022-03-08 ASSESSMENT — ENCOUNTER SYMPTOMS: RESPIRATORY NEGATIVE: 1

## 2022-03-08 NOTE — PROGRESS NOTES
3/8/2022    TELEHEALTH EVALUATION -- Audio/Visual (During [facility]-71 public health emergency)    HPI:    Mario Oliver (:  1970) has requested an audio/video evaluation for the following concern(s):    Scheduled for f/u for anxiety, depression and OCD. Taking Lamictal 25 mg daily, Buspar 5 mg BID and citalopram 40 mg daily. Pt states that symptoms have improved. She has been trying to get in with a psychiatrist for several months for bipolar disorder. Had an appointment scheduled in March, but she got a new job and her insurance changed. [facility] and states that many psychiatrists will not take her insurance. Was able to get scheduled with Dr. Pedro Joe with [facility] in April. Plan is for them to evaluate and treat for bipolar disorder. Pt denies HI or SI. Review of Systems   Constitutional: Negative. Respiratory: Negative. Cardiovascular: Negative. Neurological: Negative. Psychiatric/Behavioral: Negative. Prior to Visit Medications    Medication Sig Taking?  Authorizing Provider   lamoTRIgine (LAMICTAL) 25 MG tablet Take 1 tablet by mouth daily Yes ISABEL Barrera CNP   busPIRone (BUSPAR) 5 MG tablet TAKE ONE TABLET BY MOUTH TWICE A DAY Yes ISABEL Barrera CNP   triamcinolone (NASACORT) 55 MCG/ACT nasal inhaler 2 sprays by Each Nostril route daily Yes ISABEL Barrera CNP   citalopram (CELEXA) 40 MG tablet Take 1 tablet by mouth daily Yes ISABEL Barrera CNP   albuterol sulfate HFA (VENTOLIN HFA) 108 (90 Base) MCG/ACT inhaler Inhale 2 puffs into the lungs 4 times daily as needed for Wheezing Yes JEFFREY Can   glycopyrrolate-formoterol (BEVESPI AEROSPHERE) 9-4.8 MCG/ACT AERO Inhale 2 puffs into the lungs 2 times daily Yes Fatuma Jarrett MD   meloxicam (MOBIC) 15 MG tablet Take 1 tablet by mouth daily  Patient taking differently: Take 15 mg by mouth daily prn Yes Fatuma Jarrett MD   gabapentin (NEURONTIN) 300 MG capsule 1 AM and 2 done    Flu vaccine (1) Never done    Depression Monitoring  09/10/2022    Lipid screen  07/28/2025    DTaP/Tdap/Td vaccine (2 - Td or Tdap) 07/13/2030    HIV screen  Completed    Hepatitis A vaccine  Aged Out    Hepatitis B vaccine  Aged Out    Hib vaccine  Aged Out    Meningococcal (ACWY) vaccine  Aged Out       PHYSICAL EXAMINATION:  [ INSTRUCTIONS:  \"[x]\" Indicates a positive item  \"[]\" Indicates a negative item  -- DELETE ALL ITEMS NOT EXAMINED]  Vital Signs: (As obtained by patient/caregiver or practitioner observation)    Blood pressure-  Heart rate-    Respiratory rate-    Temperature-  Pulse oximetry-     Constitutional: [x] Appears well-developed and well-nourished [x] No apparent distress      [] Abnormal-   Mental status  [x] Alert and awake  [x] Oriented to person/place/time [x]Able to follow commands      Eyes:  EOM    []  Normal  [] Abnormal-  Sclera  []  Normal  [] Abnormal -         Discharge []  None visible  [] Abnormal -    HENT:   [x] Normocephalic, atraumatic. [] Abnormal   [] Mouth/Throat: Mucous membranes are moist.     External Ears [] Normal  [] Abnormal-     Neck: [] No visualized mass     Pulmonary/Chest: [x] Respiratory effort normal.  [x] No visualized signs of difficulty breathing or respiratory distress        [] Abnormal-      Musculoskeletal:   [] Normal gait with no signs of ataxia         [] Normal range of motion of neck        [] Abnormal-       Neurological:        [x] No Facial Asymmetry (Cranial nerve 7 motor function) (limited exam to video visit)          [] No gaze palsy        [] Abnormal-         Skin:        [x] No significant exanthematous lesions or discoloration noted on facial skin         [] Abnormal-            Psychiatric:       [x] Normal Affect [x] No Hallucinations        [] Abnormal-     Other pertinent observable physical exam findings-     ASSESSMENT/PLAN:  1. Bipolar 1 disorder, mixed, mild ([address]  See HPI. Stable.   Continue f/u with psych next month as scheduled for further evaluation and management. - lamoTRIgine (LAMICTAL) 25 MG tablet; Take 1 tablet by mouth daily  Dispense: 30 tablet; Refill: 0    2. Anxiety  See #1  - lamoTRIgine (LAMICTAL) 25 MG tablet; Take 1 tablet by mouth daily  Dispense: 30 tablet; Refill: 0      Return in about 6 months (around 9/7/2022) for Annual Physical.    Maureen Smiley, was evaluated through a synchronous (real-time) audio-video encounter. The patient (or guardian if applicable) is aware that this is a billable service, which includes applicable co-pays. This Virtual Visit was conducted with patient's (and/or legal guardian's) consent. The visit was conducted pursuant to the emergency declaration under the [address] [place] [address], [address] [place] [address] authority and the [employer] General Act. Patient identification was verified, and a caregiver was present when appropriate. The patient was located at home in a state where the provider was licensed to provide care. Total time spent on this encounter: 20 minutes    --ISABEL Ugarte CNP on 3/8/2022 at 3:58 PM    An electronic signature was used to authenticate this note.

## 2022-03-20 DIAGNOSIS — F41.9 ANXIETY: ICD-10-CM

## 2022-03-21 RX ORDER — CITALOPRAM 40 MG/1
TABLET ORAL
Qty: 90 TABLET | Refills: 1 | Status: SHIPPED
Start: 2022-03-21 | End: 2022-05-13

## 2022-03-21 NOTE — TELEPHONE ENCOUNTER
.  Refill Request     Last Seen: Last Seen Department: 3/8/2022  Last Seen by PCP: 3/8/2022    Last Written: 11-24-21 30 with 1     Next Appointment:   No future appointments.       Requested Prescriptions     Pending Prescriptions Disp Refills    citalopram (CELEXA) 40 MG tablet [Pharmacy Med Name: CITALOPRAM HBR 40 MG TABLET] 30 tablet 1     Sig: TAKE ONE TABLET BY MOUTH DAILY

## 2022-05-11 NOTE — TELEPHONE ENCOUNTER
Refill Request     CONFIRM preferrred pharmacy with the patient. If Mail Order Rx - Pend for 90 day refill. Last Seen: Last Seen Department: 3/8/2022  Last Seen by PCP: 3/8/2022    Last Written: 03/21/22 90 tab 1 refill     Next Appointment:   No future appointments.     FD: please help patient to schedule: Return in about 6 months (around 9/7/2022) for Annual Physical          Requested Prescriptions     Pending Prescriptions Disp Refills    citalopram (CELEXA) 20 MG tablet [Pharmacy Med Name: CITALOPRAM HBR 20 MG TABLET] 30 tablet      Sig: TAKE ONE TABLET BY MOUTH DAILY

## 2022-05-13 RX ORDER — CITALOPRAM 20 MG/1
TABLET ORAL
Qty: 90 TABLET | Refills: 1 | OUTPATIENT
Start: 2022-05-13

## 2022-06-02 DIAGNOSIS — F31.61 BIPOLAR 1 DISORDER, MIXED, MILD (HCC): ICD-10-CM

## 2022-06-02 DIAGNOSIS — F41.9 ANXIETY: ICD-10-CM

## 2022-06-02 RX ORDER — LAMOTRIGINE 25 MG/1
TABLET ORAL
Qty: 30 TABLET | Refills: 0 | OUTPATIENT
Start: 2022-06-02

## 2022-06-02 NOTE — TELEPHONE ENCOUNTER
Refill Request     CONFIRM preferrred pharmacy with the patient. If Mail Order Rx - Pend for 90 day refill. Last Seen: Last Seen Department: 3/8/2022  Last Seen by PCP: 3/8/2022    Last Written: 03/08/2022 30 Tablet 0 Refills    Next Appointment:   No future appointments. Message to [facility] to schedule appointment.            Return in about 6 months (around 9/7/2022) for Annual Physical.          Requested Prescriptions     Pending Prescriptions Disp Refills    lamoTRIgine (LAMICTAL) 25 MG tablet [Pharmacy Med Name: lamoTRIgine 25 MG TABLET] 30 tablet 0     Sig: TAKE ONE TABLET BY MOUTH DAILY

## 2022-08-01 DIAGNOSIS — F41.9 ANXIETY: ICD-10-CM

## 2022-08-01 NOTE — TELEPHONE ENCOUNTER
Refill Request     CONFIRM preferrred pharmacy with the patient. If Mail Order Rx - Pend for 90 day refill. Last Seen: Last Seen Department: 3/8/2022  Last Seen by PCP: 3/8/2022    Last Written: 12/28/21 180 Tablet 1 Refill    Next Appointment:   No future appointments. Message to [facility] to schedule appointment.        Return in about 6 months (around 9/7/2022) for Annual Physical.        Requested Prescriptions     Pending Prescriptions Disp Refills    busPIRone (BUSPAR) 5 MG tablet [Pharmacy Med Name: busPIRone HCL 5 MG TABLET] 180 tablet 1     Sig: TAKE ONE TABLET BY MOUTH TWICE A DAY

## 2022-08-02 RX ORDER — BUSPIRONE HYDROCHLORIDE 5 MG/1
TABLET ORAL
Qty: 180 TABLET | Refills: 1 | Status: SHIPPED | OUTPATIENT
Start: 2022-08-02

## 2022-08-05 DIAGNOSIS — M25.562 ACUTE PAIN OF LEFT KNEE: ICD-10-CM

## 2022-08-05 DIAGNOSIS — J43.8 OTHER EMPHYSEMA (HCC): ICD-10-CM

## 2022-08-05 DIAGNOSIS — M79.2 NEURALGIA AND NEURITIS: ICD-10-CM

## 2022-08-05 NOTE — TELEPHONE ENCOUNTER
Please let patient know that Ca Farmer is out of the office today. I will leave for her to review when she returns about the medication, however, as far as the COVID test I do not have results back yet. Any further questions, please let me know.  Thanks, Yamile Chowdhury Return for worsening symptoms or concerns. Recommend drinking in moderation.

## 2022-08-08 RX ORDER — MELOXICAM 15 MG/1
TABLET ORAL
Qty: 90 TABLET | Refills: 3 | OUTPATIENT
Start: 2022-08-08

## 2022-08-08 RX ORDER — GLYCOPYRROLATE AND FORMOTEROL FUMARATE 9; 4.8 UG/1; UG/1
AEROSOL, METERED RESPIRATORY (INHALATION)
Qty: 10.7 G | OUTPATIENT
Start: 2022-08-08

## 2022-08-23 ENCOUNTER — TELEMEDICINE (OUTPATIENT)
Dept: PRIMARY CARE CLINIC | Age: 52
End: 2022-08-23
Payer: COMMERCIAL

## 2022-08-23 DIAGNOSIS — J43.8 OTHER EMPHYSEMA (HCC): ICD-10-CM

## 2022-08-23 DIAGNOSIS — U07.1 COVID-19: Primary | ICD-10-CM

## 2022-08-23 PROCEDURE — 3017F COLORECTAL CA SCREEN DOC REV: CPT | Performed by: NURSE PRACTITIONER

## 2022-08-23 PROCEDURE — 99213 OFFICE O/P EST LOW 20 MIN: CPT | Performed by: NURSE PRACTITIONER

## 2022-08-23 PROCEDURE — G8427 DOCREV CUR MEDS BY ELIG CLIN: HCPCS | Performed by: NURSE PRACTITIONER

## 2022-08-23 RX ORDER — FLUVOXAMINE MALEATE 100 MG
100 TABLET ORAL DAILY
COMMUNITY

## 2022-08-23 RX ORDER — COVID-19 ANTIGEN TEST
1 KIT MISCELLANEOUS 2 TIMES DAILY PRN
COMMUNITY

## 2022-08-23 ASSESSMENT — ENCOUNTER SYMPTOMS
VOMITING: 0
ABDOMINAL PAIN: 0
WHEEZING: 1
DIARRHEA: 0
SINUS PAIN: 1
NAUSEA: 0
SHORTNESS OF BREATH: 1
COUGH: 1
SORE THROAT: 1
SINUS PRESSURE: 1

## 2022-08-23 NOTE — PROGRESS NOTES
Colonel Villagomez (:  1970) is a Established patient, here for evaluation of the following:    Positive For Covid-19 (Symptoms x 3 days)       Assessment & Plan:  Below is the assessment and plan developed based on review of pertinent history, physical exam, labs, studies, and medications. 1. COVID-19  Continue with Mucinex DM, respiratory medication, use saline sinus rinse, humidifier in bedroom. Avoid overhead fans. Use pillows to prop yourself up to help with nighttime cough. Advised patient on how to contact the virtualist using Snapsortt, isolation guidelines, and symptom management. Patient verbalized understanding. The patient was educated on reasons to seek urgent medical care including chest pain, shortness of breath or difficulty breathing at rest, severe pain, fever >102 not controlled by medication, unrelenting vomiting or diarrhea, blue-tinged lips or nailbeds, and severe weakness or confusion. Patient verbalized understanding  Patient declined taking paxlovid given the mild nature of her symptoms and the possibility of rebound symptoms. 2. Other emphysema ([address]  Assessment & Plan:   Unclear control,  symptoms currently complicated with [id number] diagnosis. Continue albuterol PRN and bevespi, follow up with pcp as planned. Return if symptoms worsen or fail to improve, for COVID-19. Subjective:   URI   This is a new problem. The current episode started in the past 7 days. The problem has been gradually worsening. There has been no fever. Associated symptoms include congestion, coughing, ear pain, headaches, sinus pain, a sore throat and wheezing. Pertinent negatives include no abdominal pain, diarrhea, nausea or vomiting. Review of Systems   Constitutional:  Positive for fatigue and fever. HENT:  Positive for congestion, ear pain, postnasal drip, sinus pressure, sinus pain and sore throat. Respiratory:  Positive for cough, shortness of breath and wheezing.     Gastrointestinal: Negative for abdominal pain, diarrhea, nausea and vomiting. Musculoskeletal:  Positive for myalgias (improved). Neurological:  Positive for headaches.      Objective:  Patient-Reported Vitals  No data recorded     Patient-Reported Vitals 12/8/2021   Patient-Reported Weight -   Patient-Reported Height -   Patient-Reported Pulse 89   Patient-Reported Temperature -   Patient-Reported SpO2 98        Physical Exam:  [INSTRUCTIONS:  \"[x]\" Indicates a positive item  \"[]\" Indicates a negative item  -- DELETE ALL ITEMS NOT EXAMINED]    Constitutional: [x] Appears well-developed and well-nourished [x] No apparent distress      [] Abnormal -     Mental status: [x] Alert and awake  [x] Oriented to person/place/time [x] Able to follow commands    [] Abnormal -     Eyes:   EOM    [x]  Normal    [] Abnormal -   Sclera  [x]  Normal    [] Abnormal -          Discharge [x]  None visible   [] Abnormal -     HENT: [x] Normocephalic, atraumatic  [x] Abnormal - pain with self-palpation of frontal and maxillary sinuses, bilateral  [x] Mouth/Throat: Mucous membranes are moist    External Ears [x] Normal  [] Abnormal -    Neck: [x] No visualized mass [x] Abnormal - tenderness to anterior cervical lymph nodes with self-palpation    Pulmonary/Chest: [x] Respiratory effort normal   [x] No visualized signs of difficulty breathing or respiratory distress        [] Abnormal -      Musculoskeletal:   [] Normal gait with no signs of ataxia         [x] Normal range of motion of neck        [] Abnormal -     Neurological:        [x] No Facial Asymmetry (Cranial nerve 7 motor function) (limited exam due to video visit)          [x] No gaze palsy        [] Abnormal -          Skin:        [x] No significant exanthematous lesions or discoloration noted on facial skin         [] Abnormal -            Psychiatric:       [x] Normal Affect [] Abnormal -        [] No Hallucinations    Other pertinent observable physical exam findings:-        On this date 8/23/2022 I have spent 20 minutes reviewing previous notes, test results and face to face (virtual) with the patient discussing the diagnosis and importance of compliance with the treatment plan as well as documenting on the day of the visit. Kizzy Elizabeth, was evaluated through a synchronous (real-time) audio-video encounter. The patient (or guardian if applicable) is aware that this is a billable service, which includes applicable co-pays. This Virtual Visit was conducted with patient's (and/or legal guardian's) consent. The visit was conducted pursuant to the emergency declaration under the [address] [place] [address], [address] [place] [address] authority and the [employer] General Act. Patient identification was verified, and a caregiver was present when appropriate. The patient was located at Home: [address] [place] [address].    Provider was located at [facility] ([facility] [address] [place] [address]

## 2022-08-23 NOTE — PATIENT INSTRUCTIONS
Continue with mucinex DM, respiratory medication, use saline sinus rinse, aleve for pain/fever, humidifier in bedroom. Avoid overhead fans. Use pillows to prop yourself up to help with nighttime cough.

## 2022-08-23 NOTE — LETTER
I had the pleasure of seeing Bianca Fletcher today for a primary care virtualist video visit secondary to COVID-19. I have provided the following recommendations: OTC medications for symptom management. I have included my note for your review and have asked the patient to follow up with you as needed. If you have questions, please reach out via [employer] secure messaging by searching for the [facility] Primary Care Virtualists. Your communication will be answered promptly by the Virtualist on service for the day. Additionally, we would love your overall feedback on this visit. Please hit shift and click the following link to let us know if the Virtualist service met your expectations. [email or web address]      Electronically signed by ISABEL Judge CNP on 8/23/22 at 2:50 PM EDT.

## 2022-08-23 NOTE — ASSESSMENT & PLAN NOTE
Unclear control,  symptoms currently complicated with [id number] diagnosis. Continue albuterol PRN and bevespi, follow up with pcp as planned.

## 2022-11-14 ENCOUNTER — HOSPITAL ENCOUNTER (EMERGENCY)
Age: 52
Discharge: HOME | End: 2022-11-14
Attending: EMERGENCY MEDICINE
Payer: COMMERCIAL

## 2022-11-14 VITALS
TEMPERATURE: 98.6 F | HEART RATE: 95 BPM | RESPIRATION RATE: 16 BRPM | WEIGHT: 160 LBS | DIASTOLIC BLOOD PRESSURE: 70 MMHG | HEIGHT: 65 IN | OXYGEN SATURATION: 98 % | SYSTOLIC BLOOD PRESSURE: 121 MMHG | BODY MASS INDEX: 26.66 KG/M2

## 2022-11-14 DIAGNOSIS — H92.01 RIGHT EAR PAIN: ICD-10-CM

## 2022-11-14 DIAGNOSIS — K04.7 DENTAL INFECTION: Primary | ICD-10-CM

## 2022-11-14 DIAGNOSIS — R09.81 NASAL CONGESTION: ICD-10-CM

## 2022-11-14 PROCEDURE — 99284 EMERGENCY DEPT VISIT MOD MDM: CPT

## 2022-11-14 PROCEDURE — 6370000000 HC RX 637 (ALT 250 FOR IP): Performed by: EMERGENCY MEDICINE

## 2022-11-14 PROCEDURE — 6360000002 HC RX W HCPCS: Performed by: EMERGENCY MEDICINE

## 2022-11-14 PROCEDURE — 96372 THER/PROPH/DIAG INJ SC/IM: CPT

## 2022-11-14 RX ORDER — AMOXICILLIN AND CLAVULANATE POTASSIUM 875; 125 MG/1; MG/1
1 TABLET, FILM COATED ORAL 2 TIMES DAILY
Qty: 14 TABLET | Refills: 0 | Status: SHIPPED | OUTPATIENT
Start: 2022-11-14 | End: 2022-11-21

## 2022-11-14 RX ORDER — IBUPROFEN 600 MG/1
600 TABLET ORAL 4 TIMES DAILY PRN
Qty: 40 TABLET | Refills: 0 | Status: SHIPPED | OUTPATIENT
Start: 2022-11-14

## 2022-11-14 RX ORDER — HYDROCODONE BITARTRATE AND ACETAMINOPHEN 5; 325 MG/1; MG/1
1 TABLET ORAL EVERY 6 HOURS PRN
Qty: 6 TABLET | Refills: 0 | Status: SHIPPED | OUTPATIENT
Start: 2022-11-14 | End: 2022-11-16

## 2022-11-14 RX ORDER — HYDROCODONE BITARTRATE AND ACETAMINOPHEN 5; 325 MG/1; MG/1
1 TABLET ORAL ONCE
Status: COMPLETED | OUTPATIENT
Start: 2022-11-14 | End: 2022-11-14

## 2022-11-14 RX ORDER — PSEUDOEPHEDRINE HCL 30 MG
30 TABLET ORAL EVERY 4 HOURS PRN
Qty: 24 TABLET | Refills: 0 | Status: SHIPPED | OUTPATIENT
Start: 2022-11-14 | End: 2022-11-14 | Stop reason: SDUPTHER

## 2022-11-14 RX ORDER — AMOXICILLIN AND CLAVULANATE POTASSIUM 875; 125 MG/1; MG/1
1 TABLET, FILM COATED ORAL 2 TIMES DAILY
Qty: 14 TABLET | Refills: 0 | Status: SHIPPED | OUTPATIENT
Start: 2022-11-14 | End: 2022-11-14 | Stop reason: SDUPTHER

## 2022-11-14 RX ORDER — IBUPROFEN 600 MG/1
600 TABLET ORAL 4 TIMES DAILY PRN
Qty: 40 TABLET | Refills: 0 | Status: SHIPPED | OUTPATIENT
Start: 2022-11-14 | End: 2022-11-14 | Stop reason: SDUPTHER

## 2022-11-14 RX ORDER — HYDROCODONE BITARTRATE AND ACETAMINOPHEN 5; 325 MG/1; MG/1
1 TABLET ORAL EVERY 6 HOURS PRN
Qty: 6 TABLET | Refills: 0 | Status: SHIPPED | OUTPATIENT
Start: 2022-11-14 | End: 2022-11-14 | Stop reason: SDUPTHER

## 2022-11-14 RX ORDER — KETOROLAC TROMETHAMINE 30 MG/ML
30 INJECTION, SOLUTION INTRAMUSCULAR; INTRAVENOUS ONCE
Status: COMPLETED | OUTPATIENT
Start: 2022-11-14 | End: 2022-11-14

## 2022-11-14 RX ORDER — PSEUDOEPHEDRINE HCL 30 MG
30 TABLET ORAL EVERY 4 HOURS PRN
Qty: 24 TABLET | Refills: 0 | Status: SHIPPED | OUTPATIENT
Start: 2022-11-14 | End: 2022-11-18

## 2022-11-14 RX ORDER — AMOXICILLIN AND CLAVULANATE POTASSIUM 875; 125 MG/1; MG/1
1 TABLET, FILM COATED ORAL EVERY 12 HOURS SCHEDULED
Status: DISCONTINUED | OUTPATIENT
Start: 2022-11-14 | End: 2022-11-14 | Stop reason: HOSPADM

## 2022-11-14 RX ADMIN — AMOXICILLIN AND CLAVULANATE POTASSIUM 1 TABLET: 875; 125 TABLET, FILM COATED ORAL at 01:31

## 2022-11-14 RX ADMIN — KETOROLAC TROMETHAMINE 30 MG: 30 INJECTION, SOLUTION INTRAMUSCULAR; INTRAVENOUS at 01:31

## 2022-11-14 RX ADMIN — HYDROCODONE BITARTRATE AND ACETAMINOPHEN 1 TABLET: 5; 325 TABLET ORAL at 01:30

## 2022-11-14 ASSESSMENT — ENCOUNTER SYMPTOMS
RHINORRHEA: 0
SINUS PRESSURE: 0
BACK PAIN: 0
CHEST TIGHTNESS: 0
ABDOMINAL PAIN: 0
VOMITING: 0
SHORTNESS OF BREATH: 0
DIARRHEA: 0
SORE THROAT: 1
COUGH: 1

## 2022-11-14 ASSESSMENT — PAIN SCALES - GENERAL: PAINLEVEL_OUTOF10: 7

## 2022-11-14 NOTE — Clinical Note
Jayesh Pacheco was seen and treated in our emergency department on 11/14/2022. She may return to work on 11/17/2022. If you have any questions or concerns, please don't hesitate to call.       Paulette Cosme, DO

## 2022-11-14 NOTE — ED PROVIDER NOTES
EMERGENCY DEPARTMENT PROVIDER NOTE      CHIEF COMPLAINT  Pharyngitis (Patient reports sore throat, right ear pain, and runny nose that started earlier today. Pt. Also reports concerns about dental infection. )        HISTORY OF PRESENT ILLNESS  Philomena Gonzalez  is a 46 y.o. female with a significant PMHX of COPD, the presents emergency department today with concerns of what feels like a dental abscess or infection on the right lower jaw, starting about 5 days ago, now with a little bit of cheek swelling, but also having some runny nose and congestion starting this morning. Patient says she had COVID-19 3 weeks ago, does not feel like she has it again, but is concerned that she may have an ear infection. She used to have a dentist, but they retired in September, and she is actively trying to find a new dentist because her teeth are \"pretty bad. \"  Patient says she is a smoker, but has not needed her inhaler recently. Is not coughing up any productive sputum. Denies any facial pain. No dizziness or headaches. No recent antibiotics. Does not get yeast infections with antibiotics. Has not taken anything for pain today. There are no other complaints, modifying factors or associated symptoms. Nursing notes reviewed. Past medical history:  has a past medical history of ADHD (attention deficit hyperactivity disorder), Anxiety, Bipolar disorder ([address] COPD (chronic obstructive pulmonary disease) ([address] and Depression. Past surgical history:  has a past surgical history that includes Toe Surgery (Right). Home medications:   Prior to Admission medications    Medication Sig Start Date End Date Taking? Authorizing Provider   HYDROcodone-acetaminophen (NORCO) 5-325 MG per tablet Take 1 tablet by mouth every 6 hours as needed for Pain for up to 2 days. Intended supply: 3 days.  Take lowest dose possible to manage pain 11/14/22 11/16/22 Yes Princess Tamayo, DO   ibuprofen (ADVIL;MOTRIN) 600 MG tablet Take 1 tablet by mouth 4 times daily as needed for Pain 11/14/22  Yes Princess Tamayo DO   amoxicillin-clavulanate (AUGMENTIN) 875-125 MG per tablet Take 1 tablet by mouth 2 times daily for 7 days 11/14/22 11/21/22 Yes Princess Tamayo DO   pseudoephedrine (SUDAFED) 30 MG tablet Take 1 tablet by mouth every 4 hours as needed for Congestion 11/14/22 11/18/22 Yes Princess Tamayo DO   fluvoxaMINE (LUVOX) 100 MG tablet Take 100 mg by mouth daily    Historical Provider, MD   dextromethorphan-guaiFENesin ([address] DM)  MG per extended release tablet Take 1 tablet by mouth every 12 hours as needed    Historical Provider, MD   Naproxen Sodium (ALEVE) 220 MG CAPS Take 1 capsule by mouth 2 times daily as needed for Pain    Historical Provider, MD   busPIRone (BUSPAR) 5 MG tablet TAKE ONE TABLET BY MOUTH TWICE A DAY 8/2/22   JEFFREY Turcios   triamcinolone (NASACORT) 55 MCG/ACT nasal inhaler 2 sprays by Each Nostril route daily 12/8/21   Sharyle Sou, APRN - CNP   albuterol sulfate HFA (VENTOLIN HFA) 108 (90 Base) MCG/ACT inhaler Inhale 2 puffs into the lungs 4 times daily as needed for Wheezing 10/14/21   JEFFREY Ann   glycopyrrolate-formoterol (BEVESPI AEROSPHERE) 9-4.8 MCG/ACT AERO Inhale 2 puffs into the lungs 2 times daily 7/15/21   Chrystal Jarrett MD   meloxicam (MOBIC) 15 MG tablet Take 1 tablet by mouth daily  Patient taking differently: Take 15 mg by mouth daily prn 7/15/21   Chrystal Jarrett MD       No Known Allergies    Social history:  reports that she has been smoking cigarettes. She has a 7.50 pack-year smoking history. She has never used smokeless tobacco. She reports current alcohol use. She reports that she does not use drugs.     Family history:    Family History   Problem Relation Age of Onset    Breast Cancer Mother     High Blood Pressure Mother     Cancer Father         esophageal    ADHD Sister     Bipolar Disorder Brother     Mental Illness Brother     Heart Disease Maternal Grandmother     Heart Attack Maternal Grandmother 48    Cancer Maternal Grandfather         throat    Alcohol Abuse Maternal Grandfather     Prostate Cancer Maternal Uncle        REVIEW OF SYSTEMS  6 systems reviewed, pertinent positives per HPI otherwise noted to be negative    Review of Systems   Constitutional:  Negative for activity change, appetite change, fatigue and fever. HENT:  Positive for dental problem, ear pain and sore throat. Negative for congestion, rhinorrhea and sinus pressure. Respiratory:  Positive for cough. Negative for chest tightness and shortness of breath. Cardiovascular:  Negative for chest pain and leg swelling. Gastrointestinal:  Negative for abdominal pain, diarrhea and vomiting. Musculoskeletal:  Negative for back pain and neck pain. Skin:  Negative for rash and wound. PHYSICAL EXAM  Vitals:    11/14/22 0106   BP: 121/70   Pulse: 95   Resp: 16   Temp: 98.6 °F (37 °C)   SpO2: 98%       Physical Exam  Vitals reviewed. Constitutional:       General: She is not in acute distress. Appearance: Normal appearance. She is ill-appearing. Comments: Appears as if not feeling well, however interactive, conversational, pleasant, and in no acute clinical cardiopulmonary distress. HENT:      Head: Normocephalic and atraumatic. Right Ear: Ear canal and external ear normal. Tympanic membrane is erythematous. Left Ear: Tympanic membrane, ear canal and external ear normal.      Nose: Congestion and rhinorrhea present. Mouth/Throat:      Mouth: Mucous membranes are moist. Mucous membranes are pale and cyanotic. Pharynx: Oropharynx is clear. Uvula midline. Posterior oropharyngeal erythema present. No pharyngeal swelling. Tonsils: No tonsillar exudate or tonsillar abscesses. Comments: There is approximately a 1.0 cm area of swelling, some induration without fluctuance to the gingiva as above.   Redness, with some slight edema to the buccal mucosa extending superiorly. No active drainage. No lesions. Eyes:      General:         Right eye: No discharge. Left eye: No discharge. Conjunctiva/sclera: Conjunctivae normal.   Cardiovascular:      Rate and Rhythm: Normal rate and regular rhythm. Pulmonary:      Effort: Pulmonary effort is normal. No respiratory distress. Breath sounds: Normal breath sounds. No wheezing or rales. Abdominal:      General: Abdomen is flat. There is no distension. Palpations: Abdomen is soft. Tenderness: There is no abdominal tenderness. Musculoskeletal:         General: No swelling or tenderness. Cervical back: Normal range of motion and neck supple. Skin:     General: Skin is warm and dry. Neurological:      General: No focal deficit present. Mental Status: She is alert and oriented to person, place, and time. Psychiatric:         Mood and Affect: Mood normal.         Behavior: Behavior normal.          ED COURSE/MDM  Nursing notes reviewed. Pt was given the following medications or treatments in the ED:   Medications   HYDROcodone-acetaminophen (NORCO) 5-325 MG per tablet 1 tablet (has no administration in time range)   amoxicillin-clavulanate (AUGMENTIN) 875-125 MG per tablet 1 tablet (has no administration in time range)   ketorolac (TORADOL) injection 30 mg (has no administration in time range)         Patient is a 26-year-old female with poor dentition, presenting the emergency department today with concerns appears to be a developing dental abscess. No obvious fluctuance, will initiate antibiotics. She does have some erythema to the right TM as well, unlikely to be otitis media, bacterial origin, but again, treating with Augmentin. She has congestion, a little bit of sore throat, likely viral URI, but does not wish to have repeat COVID testing today, and also would not take Tamiflu if her influenza was positive, so will forego testing.   Patient is comfortable with this plan. Her lungs are clear, encouraged decreasing tobacco smoking. Has not used an albuterol inhaler recently, I do not feel she is having COPD exacerbation at this time. Discharged home with multimodal pain control, Augmentin, and strict return precautions. Also, provided dental list as patient does not have a dentist.      Clinical Impression  Based on the presenting complaint, history, and physical exam, multiple diagnoses were considered. Exam and workup here most c/w:      1. Dental infection    2. Right ear pain    3. Nasal congestion        I discussed with Isabell Bagley the results of evaluation in the ED, diagnosis, care, and prognosis. The plan is to discharge to home. Patient is in agreement with plan and questions have been answered. Patient was instructed to return to the ED should they experience any concerning new or worsening symptoms. Instructed patient to follow up with their PCP in 1-3 days or as soon as possible for follow up. Patient understands and agrees with the discharge plan, and I have answered all their questions at this time. Patient is stable for discharge home from the ED at this time. Patient will be started on the following medications from the ED:  New Prescriptions    AMOXICILLIN-CLAVULANATE (AUGMENTIN) 875-125 MG PER TABLET    Take 1 tablet by mouth 2 times daily for 7 days    HYDROCODONE-ACETAMINOPHEN (NORCO) 5-325 MG PER TABLET    Take 1 tablet by mouth every 6 hours as needed for Pain for up to 2 days. Intended supply: 3 days. Take lowest dose possible to manage pain    IBUPROFEN (ADVIL;MOTRIN) 600 MG TABLET    Take 1 tablet by mouth 4 times daily as needed for Pain    PSEUDOEPHEDRINE (SUDAFED) 30 MG TABLET    Take 1 tablet by mouth every 4 hours as needed for Congestion         Disposition  Pt is discharged in stable condition.     Disposition Vitals:  /70   Pulse 95   Temp 98.6 °F (37 °C)   Resp 16   Ht 5' 5\" (1.651 m)   Wt 160 lb (72.6 kg) LMP 08/05/2020   SpO2 98%   BMI 26.63 kg/m²       PRINCESS JETT, DO        Princess Jett, DO  11/14/22 0131

## 2022-11-14 NOTE — DISCHARGE INSTRUCTIONS
Toothache    Toothaches are generally caused by tooth decay. If you have severe pain or swelling around a tooth, you may have a deep tooth infection. Tooth decay and infections require evaluation and treatment by a dentist or an oral surgeon. The emergency department will provide you with the best possible care available for your dental problem. Unfortunately, there is not a dentist or dental clinic available in the department. Routine dental care, such as fillings, tooth extractions, or becky canals are not available in the emergency department. However, we are avaialbe for emergencies including abscesses, fractures of the jaw and other oral trauma such as a tooth that is knocked out. Any other dental problem is best treated by a dentist.  Please see the list of dental clinics in the area if you do not currently have a dentist.      Treatment That Can Be Provided for Toothache in the Emergency Department    If you have a severe toothache, medication may be prescribed for you until you are able to see a dentist.  If you are given an antibiotic, take it as prescribed and continue to take it until gone. Even if you start to feel better, your toothache will need to be treated by a dentist or an oral surgeon. Pain medication may be prescribed for you. As is the policy of the [facility] Department, the emergency department uses nonsteroidal anti-inflammatory medication (NSAIDs) as the primary medication for pain. These may include ibuprofen (Motrin®) or naproxyn sodium (Naprosyn®). Patients who are unable to take nonsteroidal anti-inflammatory medications will generally be advised to take acetaminophen (Tylenol®) for dental pain. As is the policy of the [address] [place] [address], the hospitals emergency department policy strongly discourages the use of the narcotic medications. (Tylenol #3®, Vicodin®, etc) are restricted by specific, strict guidelines.     If you have any questions concerning these instructions, call the emergency department at [facility] @ [phone]. Consult your care giver regarding these instructions and seek your physicians advice regarding medical care. Dental Referrals  Following is a list of local clinics that treat dental problems. Many also have specific emergency hours. For an appointment or for hours of operation, contact the clinic. [address]  [phone]     [facility]  [address]    [facility] (referral agency)  [address] [facility], [address] [address] [facility]  [phone] or [phone]  (Application Process, Must Qualify)     [facility]  [address] [facility]. [address]  [phone]  ([facility] Medicaid and Insurance with Payment plans for Self-Insured)     Clinics:    [address] [facility]   [address]  [place], [address]  [address] [facility]  [address]  [facility] [address]. [place], [address]  [address] [facility], [address] [facility], [address] Street:    [facility] Josselin Aguilar  Appointment only  [address] [facility], [address] [facility]  Vivian-Burmese speaking  [address]  [address]  [place], [address]  [address] [facility]  [address] [facility] [address]  Marko Ella [facility] [address]  [address]  [address]  [facility] [address].   [place], [address]  [facility]  [address], [place] [address]  [address]  [address] [facility], [address]     [facility]   [address]  [place], [address]  [phone] ext.  [address]  [address] [facility], [address] [facility]  [phone] or [phone]  (Must qualify)

## 2022-11-22 ENCOUNTER — TELEPHONE (OUTPATIENT)
Dept: FAMILY MEDICINE CLINIC | Age: 52
End: 2022-11-22

## 2022-11-22 ENCOUNTER — NURSE TRIAGE (OUTPATIENT)
Dept: OTHER | Facility: CLINIC | Age: 52
End: 2022-11-22

## 2022-11-22 NOTE — TELEPHONE ENCOUNTER
Patient called in with Dental pain. She was seen in ED on 11/14/2022 and given antibiotics. She is on a waiting list with a Dentist to be seen. If you can send in another round of antibiotics? Please see NT note. Or does patient needs an appointment with you? She states that she needs a Dr note for her work form missing last night, and for tonight.

## 2022-11-22 NOTE — TELEPHONE ENCOUNTER
Location of patient: Yanira Miller call from [facility] at [employer] with [employer]. Subjective: Caller states \"My right gland under my chin is swollen. I have a took abscess and can't get into my dentist, I am on a waiting list to be seen. \"     Current Symptoms: toothache on right side, shooting pain on right side  ER 1 week ago for toothache    Onset: 1 week ago;     Associated Symptoms: NA    Pain Severity: 6/10; throbbing, shooting; constant    Temperature: None     What has been tried: Antibiotics (completed antibiotics from ER)     Recommended disposition: Speak with dentist. Patient stated her dentist has retired and she is on the wait list to be seen by another one which will be about 6 months. Care advice provided, patient verbalizes understanding; denies any other questions or concerns; instructed to call back for any new or worsening symptoms. Connected with Genesis Emerson in the office. Attention Provider: Thank you for allowing me to participate in the care of your patient. The patient was connected to triage in response to information provided to the ECC/PSC. Please do not respond through this encounter as the response is not directed to a shared pool.       Reason for Disposition   Toothache present > 24 hours    Protocols used: Toothache-ADULT-
There are currently 3 messages regarding this same pt/issue. Please see previous messages. Pt needs an appt.
None known

## 2022-11-22 NOTE — TELEPHONE ENCOUNTER
See previous notes. Pt will need an appointment for this. OV or VV is fine. She could also see one of the virtualists.

## 2022-11-22 NOTE — TELEPHONE ENCOUNTER
Called patient and I have her scheduled at [address] [place] [address] on 11/23/2022 for a vv for this.

## 2022-11-23 ENCOUNTER — TELEMEDICINE (OUTPATIENT)
Dept: FAMILY MEDICINE CLINIC | Age: 52
End: 2022-11-23
Payer: COMMERCIAL

## 2022-11-23 DIAGNOSIS — K04.7 DENTAL INFECTION: Primary | ICD-10-CM

## 2022-11-23 PROCEDURE — 99213 OFFICE O/P EST LOW 20 MIN: CPT | Performed by: NURSE PRACTITIONER

## 2022-11-23 PROCEDURE — G8427 DOCREV CUR MEDS BY ELIG CLIN: HCPCS | Performed by: NURSE PRACTITIONER

## 2022-11-23 PROCEDURE — 3017F COLORECTAL CA SCREEN DOC REV: CPT | Performed by: NURSE PRACTITIONER

## 2022-11-23 RX ORDER — AMOXICILLIN AND CLAVULANATE POTASSIUM 875; 125 MG/1; MG/1
1 TABLET, FILM COATED ORAL 2 TIMES DAILY
Qty: 14 TABLET | Refills: 0 | Status: SHIPPED | OUTPATIENT
Start: 2022-11-23 | End: 2022-11-30

## 2022-11-23 RX ORDER — OXCARBAZEPINE 300 MG/1
300 TABLET, FILM COATED ORAL 2 TIMES DAILY
COMMUNITY

## 2022-11-23 SDOH — ECONOMIC STABILITY: FOOD INSECURITY: WITHIN THE PAST 12 MONTHS, YOU WORRIED THAT YOUR FOOD WOULD RUN OUT BEFORE YOU GOT MONEY TO BUY MORE.: NEVER TRUE

## 2022-11-23 SDOH — ECONOMIC STABILITY: FOOD INSECURITY: WITHIN THE PAST 12 MONTHS, THE FOOD YOU BOUGHT JUST DIDN'T LAST AND YOU DIDN'T HAVE MONEY TO GET MORE.: NEVER TRUE

## 2022-11-23 ASSESSMENT — PATIENT HEALTH QUESTIONNAIRE - PHQ9
8. MOVING OR SPEAKING SO SLOWLY THAT OTHER PEOPLE COULD HAVE NOTICED. OR THE OPPOSITE, BEING SO FIGETY OR RESTLESS THAT YOU HAVE BEEN MOVING AROUND A LOT MORE THAN USUAL: 0
SUM OF ALL RESPONSES TO PHQ9 QUESTIONS 1 & 2: 0
7. TROUBLE CONCENTRATING ON THINGS, SUCH AS READING THE NEWSPAPER OR WATCHING TELEVISION: 0
10. IF YOU CHECKED OFF ANY PROBLEMS, HOW DIFFICULT HAVE THESE PROBLEMS MADE IT FOR YOU TO DO YOUR WORK, TAKE CARE OF THINGS AT HOME, OR GET ALONG WITH OTHER PEOPLE: 0
9. THOUGHTS THAT YOU WOULD BE BETTER OFF DEAD, OR OF HURTING YOURSELF: 0
1. LITTLE INTEREST OR PLEASURE IN DOING THINGS: 0
SUM OF ALL RESPONSES TO PHQ QUESTIONS 1-9: 0
SUM OF ALL RESPONSES TO PHQ QUESTIONS 1-9: 0
4. FEELING TIRED OR HAVING LITTLE ENERGY: 0
5. POOR APPETITE OR OVEREATING: 0
SUM OF ALL RESPONSES TO PHQ QUESTIONS 1-9: 0
SUM OF ALL RESPONSES TO PHQ QUESTIONS 1-9: 0
2. FEELING DOWN, DEPRESSED OR HOPELESS: 0
3. TROUBLE FALLING OR STAYING ASLEEP: 0
6. FEELING BAD ABOUT YOURSELF - OR THAT YOU ARE A FAILURE OR HAVE LET YOURSELF OR YOUR FAMILY DOWN: 0

## 2022-11-23 ASSESSMENT — SOCIAL DETERMINANTS OF HEALTH (SDOH): HOW HARD IS IT FOR YOU TO PAY FOR THE VERY BASICS LIKE FOOD, HOUSING, MEDICAL CARE, AND HEATING?: NOT HARD AT ALL

## 2022-11-23 NOTE — LETTER
[address]  [facility] [address]  Phone: [phone]  Fax: [phone]    ISABEL Dsouza CNP        November 23, 2022     Patient: Polly Wang   YOB: 1970   Date of Visit: 11/23/2022       To Whom It May Concern:    Please excuse Jesenia Ellington from work on 11/21/2022 and 11/22/2022. If you have any questions or concerns, please don't hesitate to call.     Sincerely,          ISABEL Dsouza CNP

## 2022-11-23 NOTE — PROGRESS NOTES
2022    TELEHEALTH EVALUATION -- Audio/Visual (During RPKTD-42 public health emergency)    HPI:    Sandra Cedeño (:  1970) has requested an audio/video evaluation for the following concern(s):    Pt c/o lower right dental and gum pain for the past several months. She went to the ER on 2022 and was prescribed Augmentin which she states that she took as directed, but thinks that she still has the infection. C/o continue pain and \"foul taste\" in her mouth. Is also having some swelling in that area. States that her dentist recently retired and she has tried to get [facility] offices, but they both have a waiting lists. She states that she has considered going to a different dentist and paying out of pocket. She is also requesting a work note b/c she missed work last night and the night before d/t the pain. Denies fever or chills. Review of Systems   All other systems reviewed and are negative. Prior to Visit Medications    Medication Sig Taking?  Authorizing Provider   OXcarbazepine (TRILEPTAL) 300 MG tablet Take 300 mg by mouth 2 times daily Yes Historical Provider, MD   amoxicillin-clavulanate (AUGMENTIN) 875-125 MG per tablet Take 1 tablet by mouth 2 times daily for 7 days Yes ISABEL John CNP   ibuprofen (ADVIL;MOTRIN) 600 MG tablet Take 1 tablet by mouth 4 times daily as needed for Pain Yes Princess Tamayo DO   fluvoxaMINE (LUVOX) 100 MG tablet Take 100 mg by mouth daily Yes Historical Provider, MD   dextromethorphan-guaiFENesin ([place] 598 DM)  MG per extended release tablet Take 1 tablet by mouth every 12 hours as needed Yes Historical Provider, MD   busPIRone (BUSPAR) 5 MG tablet TAKE ONE TABLET BY MOUTH TWICE A DAY Yes JEFFREY Barber   triamcinolone (NASACORT) 55 MCG/ACT nasal inhaler 2 sprays by Each Nostril route daily Yes ISABEL John CNP   albuterol sulfate HFA (VENTOLIN HFA) 108 (90 Base) MCG/ACT inhaler Inhale 2 puffs into the lungs 4 times daily as needed for Wheezing Yes JEFFREY Jordan   glycopyrrolate-formoterol (BEVESPI AEROSPHERE) 9-4.8 MCG/ACT AERO Inhale 2 puffs into the lungs 2 times daily Yes Fatuma Jarrett MD   meloxicam (MOBIC) 15 MG tablet Take 1 tablet by mouth daily  Patient taking differently: Take 15 mg by mouth daily prn Yes Noemi Jarrett MD       Social History     Tobacco Use    Smoking status: Every Day     Packs/day: 0.25     Years: 30.00     Pack years: 7.50     Types: Cigarettes    Smokeless tobacco: Never   Vaping Use    Vaping Use: Never used   Substance Use Topics    Alcohol use: Yes     Alcohol/week: 0.0 - 4.0 standard drinks     Comment: occasional    Drug use: Never        No Known Allergies,   Past Medical History:   Diagnosis Date    ADHD (attention deficit hyperactivity disorder)     Anxiety     Bipolar disorder (HCC)     COPD (chronic obstructive pulmonary disease) (HCC)     Depression    ,   Past Surgical History:   Procedure Laterality Date    TOE SURGERY Right     great toe-bone spur   ,   Social History     Tobacco Use    Smoking status: Every Day     Packs/day: 0.25     Years: 30.00     Pack years: 7.50     Types: Cigarettes    Smokeless tobacco: Never   Vaping Use    Vaping Use: Never used   Substance Use Topics    Alcohol use:  Yes     Alcohol/week: 0.0 - 4.0 standard drinks     Comment: occasional    Drug use: Never   ,   Family History   Problem Relation Age of Onset    Breast Cancer Mother     High Blood Pressure Mother     Cancer Father         esophageal    ADHD Sister     Bipolar Disorder Brother     Mental Illness Brother     Heart Disease Maternal Grandmother     Heart Attack Maternal Grandmother 48    Cancer Maternal Grandfather         throat    Alcohol Abuse Maternal Grandfather     Prostate Cancer Maternal Uncle    ,   Immunization History   Administered Date(s) Administered    Tdap (Boostrix, Adacel) 07/13/2020   ,   Health Maintenance   Topic Date Due    COVID-19 Vaccine (1) Never done    Pneumococcal 0-64 years Vaccine (1 - PCV) Never done    Hepatitis C screen  Never done    Cervical cancer screen  Never done    Colorectal Cancer Screen  Never done    Breast cancer screen  02/27/2016    Shingles vaccine (1 of 2) Never done    Flu vaccine (1) Never done    Depression Monitoring  09/10/2022    Lipids  07/28/2025    DTaP/Tdap/Td vaccine (2 - Td or Tdap) 07/13/2030    HIV screen  Completed    Hepatitis A vaccine  Aged Out    Hib vaccine  Aged Out    Meningococcal (ACWY) vaccine  Aged Out       PHYSICAL EXAMINATION:  telephone visit. Pt was unable to connect. [ INSTRUCTIONS:  \"[x]\" Indicates a positive item  \"[]\" Indicates a negative item  -- DELETE ALL ITEMS NOT EXAMINED]  Vital Signs: (As obtained by patient/caregiver or practitioner observation)    Blood pressure-  Heart rate-    Respiratory rate-    Temperature-  Pulse oximetry-     Constitutional: [] Appears well-developed and well-nourished [] No apparent distress      [] Abnormal-   Mental status  [] Alert and awake  [] Oriented to person/place/time []Able to follow commands      Eyes:  EOM    []  Normal  [] Abnormal-  Sclera  []  Normal  [] Abnormal -         Discharge []  None visible  [] Abnormal -    HENT:   [] Normocephalic, atraumatic.   [] Abnormal   [] Mouth/Throat: Mucous membranes are moist.     External Ears [] Normal  [] Abnormal-     Neck: [] No visualized mass     Pulmonary/Chest: [] Respiratory effort normal.  [] No visualized signs of difficulty breathing or respiratory distress        [] Abnormal-      Musculoskeletal:   [] Normal gait with no signs of ataxia         [] Normal range of motion of neck        [] Abnormal-       Neurological:        [] No Facial Asymmetry (Cranial nerve 7 motor function) (limited exam to video visit)          [] No gaze palsy        [] Abnormal-         Skin:        [] No significant exanthematous lesions or discoloration noted on facial skin [] Abnormal-            Psychiatric:       [] Normal Affect [] No Hallucinations        [] Abnormal-     Other pertinent observable physical exam findings-     ASSESSMENT/PLAN:  1. Dental infection  Will send in another prescription for Augmentin BID for 7 days. Pt states that she is going to try to call her old dentists' office to see if a different dentist there can see her. She is also going to continue to call [place] [address] offices in [facility]. [place] to see if they have had any cancellations. I also recommended that she call around to other local dental offices to see if they offer some sort of payment plans for pt's paying out of pocket. Pt VU and agrees with POC. - amoxicillin-clavulanate (AUGMENTIN) 875-125 MG per tablet; Take 1 tablet by mouth 2 times daily for 7 days  Dispense: 14 tablet; Refill: 0    Return if symptoms worsen or fail to improve. Huy Grant, was evaluated through a synchronous (real-time) audio-video encounter. The patient (or guardian if applicable) is aware that this is a billable service, which includes applicable co-pays. This Virtual Visit was conducted with patient's (and/or legal guardian's) consent. The visit was conducted pursuant to the emergency declaration under the [facility], [address] [place] [address] authority and the [employer] Act. Patient identification was verified, and a caregiver was present when appropriate. The patient was located at Home: [address] Provider was located at [facility] (Appt Dept): [address]  [address] [facility]. Total time spent on this encounter: Not billed by time    --ISABEL Hargrove CNP on 11/23/2022 at 1:26 PM    An electronic signature was used to authenticate this note.

## 2023-11-08 ENCOUNTER — TELEPHONE (OUTPATIENT)
Dept: SURGERY | Age: 53
End: 2023-11-08

## 2023-11-08 NOTE — TELEPHONE ENCOUNTER
Pt notified on 2 occasions to schedule Mohs surgery. No return call to schedule. Informed referring MD office. Chart in media.

## 2024-04-15 ENCOUNTER — PROCEDURE VISIT (OUTPATIENT)
Dept: SURGERY | Age: 54
End: 2024-04-15
Payer: COMMERCIAL

## 2024-04-15 VITALS — HEART RATE: 88 BPM | SYSTOLIC BLOOD PRESSURE: 112 MMHG | DIASTOLIC BLOOD PRESSURE: 84 MMHG

## 2024-04-15 DIAGNOSIS — C44.722 SQUAMOUS CELL CARCINOMA OF SKIN OF RIGHT LOWER EXTREMITY: Primary | ICD-10-CM

## 2024-04-15 PROCEDURE — 17313 MOHS 1 STAGE T/A/L: CPT | Performed by: DERMATOLOGY

## 2024-04-15 NOTE — PROGRESS NOTES
PRE-PROCEDURE SCREENING    Pacemaker/ICD: No  Difficulty with numbing in the past: No  Local Anesthesia Reaction/passing out: No  Latex or adhesive allergy:  No  Any history of reaction to suture or skin glue:  no  Bleeding/Clotting Disorders: No  Anticoagulant Therapy: No  Joint prosthesis: No  Artificial Heart Valve: No  Stroke or Seizures: No  Organ Transplant or Lymphoma: No  Immunosuppression: No  Respiratory Problems: No

## 2024-04-15 NOTE — PROGRESS NOTES
MOHS PROCEDURE NOTE    PHYSICIAN:  Tati Sanchez MD, Who operated in two distinct and integrated capacities as the surgeon removing the tissue and as the pathologist examining the tissue.    ASSISTANT: Noel Jonas RN     REFERRING PROVIDER:  Ame Pappas PA-C    PREOPERATIVE DIAGNOSIS: Invasive well-differentiated Squamous Cell Carcinoma     SPECIFIC MOHS INDICATIONS:  recurrent nature and need for tissue conservation    AUC SCORIN/9    POSTOPERATIVE DIAGNOSIS: SAME    LOCATION: Right Ankle Anterior    OPERATIVE PROCEDURE:  MOHS MICROGRAPHIC SURGERY    RECONSTRUCTION OF DEFECT: Second Intention Wound Healing    PREOPERATIVE SIZE: 11 x 6 MM    DEFECT SIZE: 16 x 11 MM    LENGTH OF REPAIRED WOUND/SIZE OF FLAP/SIZE OF GRAFT:  N/A    ANESTHESIA:  6 mL 1% lidocaine with epinephrine 1:100,000 buffered.     EBL:  MINIMAL    DURATION OF PROCEDURE:  30 MIN    POSTOPERATIVE OBSERVATION: 32 MIN    SPECIMENS:  SEE MOHS MAP    COMPLICATIONS:  NONE    DESCRIPTION OF PROCEDURE:  The patient was given a mirror, as appropriate, and the biopsy site was identified, marked with a surgical marking pen, and verified by the patient.   Options for treatment were discussed and the patient was informed that Mohs surgery was the selected treatment based on its lower recurrence rate, given the features listed above, as compared to other treatment modalities such as excision, radiation, or curettage, and agreed with this treatment plan.  Risks and benefits including bruising, swelling, bleeding, infection, nerve injury, recurrence, and scarring were discussed with the patient prior to the procedure and a written consent detailing these and other risks was reviewed with the patient and signed.    There was a time out for person and procedure verification.  The surgical site was prepped with an antiseptic solution.  Application of an antiseptic solution was repeated before each surgical stage.      Stage I:  The clinically-apparent

## 2024-04-16 ENCOUNTER — TELEPHONE (OUTPATIENT)
Dept: SURGERY | Age: 54
End: 2024-04-16

## 2024-04-16 NOTE — TELEPHONE ENCOUNTER
The patient was in the office on 4.15.24 for MOHS procedure located on the Right Ankle Anterior with 2nd intention wound healing.  The patient tolerated the procedure well and left the office in good condition.    A post-operative telephone call was placed at 8:55 a.m. in order to check on the patient's recovery process.  The patient was not able to be reached and a phone message was left.

## 2024-04-17 ENCOUNTER — TELEPHONE (OUTPATIENT)
Dept: SURGERY | Age: 54
End: 2024-04-17

## 2024-04-17 NOTE — TELEPHONE ENCOUNTER
Called patient and she was requesting a letter to be written for her to return to work one day later. Advised patient that Dr. Sanchez typically writes letter for 48 hours to return to work. Patient stated that she was worried about standing for long hours and was requesting another letter. Informed patient that Dr. Sanchez already wrote and signed off on letter but would have to talk to her tomorrow abut a new letter. Patient stated she would call back tomorrow.

## 2024-04-17 NOTE — TELEPHONE ENCOUNTER
Patient lvm stating had surgery on Monday 4/15 and was taken off work for 48 hrs. Pt is asking is it possible she can take one more day off?

## 2024-04-18 ENCOUNTER — TELEPHONE (OUTPATIENT)
Dept: SURGERY | Age: 54
End: 2024-04-18

## 2024-04-18 NOTE — TELEPHONE ENCOUNTER
The patient had left a message at 10:20 pm on [employer], requesting a call this morning inquiring if Dr. Sanchez could extend her work note for 1 more day, which was last night's shift.  She states she stands for 10 hour shifts and currently she's having swelling to the area and she feels that she cannot work.     Can her work note be altered to cover 4/17's shift?  If, so I will draft a new letter and send it to the pt.

## 2024-04-19 NOTE — TELEPHONE ENCOUNTER
The patient was contacted and is aware new letter has been drafted and will be provided to her signed on 4/22/24.

## 2024-04-23 NOTE — TELEPHONE ENCOUNTER
Pt is resting and is following verbal instructions provided by Dr. Sanchez and has received the amended work note to return to work on 4/24.

## 2024-04-25 ENCOUNTER — TELEPHONE (OUTPATIENT)
Dept: SURGERY | Age: 54
End: 2024-04-25

## 2024-04-25 NOTE — TELEPHONE ENCOUNTER
See Mohs email account for message from pt sent on 4/24 at 2:18 pm.  Please call pt with Dr. Sanchez's recommendation.

## 2024-04-25 NOTE — TELEPHONE ENCOUNTER
Spoke with Jacoby, pharmacist at [facility] in [facility] and ordered keflex 500 mg three times a day x 5 days per Dr. Sanchez order.

## 2024-04-29 ENCOUNTER — PROCEDURE VISIT (OUTPATIENT)
Dept: SURGERY | Age: 54
End: 2024-04-29
Payer: COMMERCIAL

## 2024-04-29 VITALS — DIASTOLIC BLOOD PRESSURE: 80 MMHG | SYSTOLIC BLOOD PRESSURE: 127 MMHG | HEART RATE: 84 BPM

## 2024-04-29 DIAGNOSIS — C44.311 BASAL CELL CARCINOMA OF RIGHT SIDE OF NOSE: Primary | ICD-10-CM

## 2024-04-29 PROCEDURE — 3017F COLORECTAL CA SCREEN DOC REV: CPT | Performed by: DERMATOLOGY

## 2024-04-29 PROCEDURE — 17311 MOHS 1 STAGE H/N/HF/G: CPT | Performed by: DERMATOLOGY

## 2024-04-29 PROCEDURE — 4004F PT TOBACCO SCREEN RCVD TLK: CPT | Performed by: DERMATOLOGY

## 2024-04-29 PROCEDURE — 17312 MOHS ADDL STAGE: CPT | Performed by: DERMATOLOGY

## 2024-04-29 PROCEDURE — G8421 BMI NOT CALCULATED: HCPCS | Performed by: DERMATOLOGY

## 2024-04-29 PROCEDURE — G8427 DOCREV CUR MEDS BY ELIG CLIN: HCPCS | Performed by: DERMATOLOGY

## 2024-04-29 PROCEDURE — 15260 FTH/GFT FR N/E/E/L 20 SQCM/<: CPT | Performed by: DERMATOLOGY

## 2024-04-29 PROCEDURE — 99213 OFFICE O/P EST LOW 20 MIN: CPT | Performed by: DERMATOLOGY

## 2024-04-29 RX ORDER — IBUPROFEN 200 MG
200 TABLET ORAL EVERY 6 HOURS PRN
COMMUNITY

## 2024-04-29 RX ORDER — CEPHALEXIN 500 MG/1
500 CAPSULE ORAL 3 TIMES DAILY
COMMUNITY

## 2024-04-29 NOTE — PATIENT INSTRUCTIONS
care and start with rolling Q-tip in the base of the wound in a circular fashion to remove any fibrin buildup as discussed in today's appointment.    DAILY WOUND CARE-SECOND INTENTION - LEG    1.  Keep the area absolutely dry for 24 to 48 hours.          -After 24 to 48 hours you may remove the bandage and shower.  2.  Remove the bandage and clean the wound with mild soap and water. Try to clean off crust and debris.            It is important not to allow a scab to form as scabs slow down the healing process.   3. Soak with a diluted vinegar solution of 1 part white table vinegar to 4 parts water for 5 minutes daily to help disinfect the area.    4.  Apply a layer of Vaseline (or Bacitracin if your doctor recommends) to the wound area only.  5.  Cut a piece of Non-stick dressing, such as Telfa, to fit just over the wound and secure it with paper tape. If the wound is small you may use a Band-Aid  6.  Elevate your legs whenever you are in a seated position for more than 15 minutes.  Wear compression stockings or support hose if possible to reduce swelling.    You should continue daily wound care and keep a bandage on until new skin has grown over the entire wound    Bleeding: If bleeding occurs, DO NOT remove the bandage. Put firm pressure on the area with gauze for 20 minutes without peeking. If the bleeding continues, apply pressure for 20 minutes more.  If the bleeding does not stop after you apply pressure, call us right away. If you can’t call, go to the nearest emergency room or urgent care facility.    POST-OPERATIVE INSTRUCTIONS     1. Activity: Do not lift anything heavier than a gallon of milk for 1 week. Also, avoid strenuous activity such as running, power walking or contact sports.  2.  Eating and drinking: Do not drink alcohol for 48 hours after your procedure. Alcohol increases the chances of bleeding.  3.  Medicines:  -If you have discomfort, take acetaminophen (Tylenol or Extra Strength Tylenol).

## 2024-04-29 NOTE — PROGRESS NOTES
MOHS PROCEDURE NOTE    PHYSICIAN:  Tati Sanchez MD, Who operated in two distinct and integrated capacities as the surgeon removing the tissue and as the pathologist examining the tissue.    ASSISTANT: Noel Jonas RN; Lety Marsh RN; Zeinab House MA     REFERRING PROVIDER:  Ame Pappas PA-C    PREOPERATIVE DIAGNOSIS: Nodular Basal Cell Carcinoma     SPECIFIC MOHS INDICATIONS:  size, location, and need for tissue conservation    AUC SCORIN/9    POSTOPERATIVE DIAGNOSIS: SAME    LOCATION: Right Nasal Alar Groove    OPERATIVE PROCEDURE:  MOHS MICROGRAPHIC SURGERY    RECONSTRUCTION OF DEFECT:  Advancement Flap with Full-Thickness Skin Graft    PREOPERATIVE SIZE: 14 x 10 MM    DEFECT SIZE: 18 x 17 MM    LENGTH OF REPAIRED WOUND/SIZE OF FLAP/SIZE OF GRAFT:  1cm2    ANESTHESIA:  13 mL 1% lidocaine with epinephrine 1:100,000 buffered.     EBL:  MINIMAL    DURATION OF PROCEDURE:  2 HOURS AND 30 MIN    POSTOPERATIVE OBSERVATION: 46 MIN    SPECIMENS:  SEE MOHS MAP    COMPLICATIONS:  NONE    DESCRIPTION OF PROCEDURE:  The patient was given a mirror, as appropriate, and the biopsy site was identified, marked with a surgical marking pen, and verified by the patient.   Options for treatment were discussed and the patient was informed that Mohs surgery was the selected treatment based on its lower recurrence rate, given the features listed above, as compared to other treatment modalities such as excision, radiation, or curettage, and agreed with this treatment plan.  Risks and benefits including bruising, swelling, bleeding, infection, nerve injury, recurrence, and scarring were discussed with the patient prior to the procedure and a written consent detailing these and other risks was reviewed with the patient and signed.    There was a time out for person and procedure verification.  The surgical site was prepped with an antiseptic solution.  Application of an antiseptic solution was repeated before each

## 2024-04-29 NOTE — PROGRESS NOTES
S: The patient is here for wound check s/p Mohs surgery on the right anterior ankle with second intent wound healing, 2 week(s) ago.  The patient c/o persistent pain but this has sig improved since not having to stand for long periods of time and starting keflex last thursday.  O:  The wound has moderate fibrin.  No erythema/purulence/pain.      A/P:  Chronic problem: is not at treatment goal:  open wound of the right anterior ankle s/p Mohs surgery.  Status:  The wound is healing well by second intention.  No s/sx infection/bleeding. Fibrin debrided with forceps    The area was cleansed with a gentle cleanser, a small amount of Aquaphor and a non-stick dressing applied.   Detailed second intention wound care instructions reviewed with the patient including daily gentle cleansing with mild cleanser such as cetaphil, application of topical petrolatum or aquaphor and wound coverage. Reminder to remove excessive fibrin as necessary, best after cleansing when fibrin is less adherent.  Pt education on how to perform this.  Healing time discussed.  The patient is scheduled for f/u wound check in 4 week(s).    OTC Meds:  continue aquaphor  Prescription Meds:  complete the 5 day course of keflex 500mg tid  Co-morbid conditions affecting healing (I.e. tobacco, diabetes, pvd, peripheral edema, immunosuppression):  no

## 2024-04-30 ENCOUNTER — TELEPHONE (OUTPATIENT)
Dept: SURGERY | Age: 54
End: 2024-04-30

## 2024-04-30 NOTE — TELEPHONE ENCOUNTER
The patient was in the office on 4.29.24 for MOHS procedure located on the Right Nasal Alar Groove with Advancement Flap with FTSG repair.  The patient tolerated the procedure well and left the office in good condition.    A post-operative telephone call was placed at 9:00 a m in order to check on the patient's recovery process.  The patient was not able to be reached and a phone message was left.

## 2024-04-30 NOTE — TELEPHONE ENCOUNTER
Pt called office at 4:28 pm; requesting to speak with me regarding r/s 2 appts, the Mohs lip appt to July (7/25 new appt at 8:30) and w/c appt to 5/6 at 3pm.     In addition, she noted that her work is requesting a new work note to have specific noted.  I asked her to send us an email with the what needs to be mentioned in the letter.  Check Mohs email account tomorrow morning with information for a new letter to be drafted please. When complete it will need to be faxed to employer.      Tues 5/7 appt cx and r/s to Thurs 7/25, fyi.- RN's, please see for charting and creating a new work note for pt.      (Please advise which urgent case should be moved up to 5/7.  I do have 2 pt's in mind; is it ok to offer them appt? Checking with Dr. Sanchez to advise.)

## 2024-05-01 NOTE — TELEPHONE ENCOUNTER
Makenzie,    In regards to our previous conversation, my HR manger needs one note stating the dates I was off and that I can return with no restrictions, please and thank you…. Something along the lines of this:    Due to medical reasons, Vicki was off 4/15-4/17 and 4/22-5/3 and I may return on 5/5 without any restrictions. Also, signed by the doctor, please and thank you.    Surgery on leg was Monday, 4/15/24.  I was off 4/15 - 4/17. I returned to work on 4/18-4/19. I was off the entire following week of 4/22-4/28.    2nd surgery was 4/29. I was given a return to work date of Sunday, 5/5. Hopefully I can return prior to that.    Im sorry this became so confusing and for the inconvenience.  Please forward to the necessary people. Thank you.    Sincerely,  Vicki Davies    3:56p 5/1/2024  Letter completed, PT advised and will ask Dr. Sanchez to sign and it will be faxed to her employer Thurs am:   Fax To:  [employer]                 [phone]

## 2024-05-02 NOTE — TELEPHONE ENCOUNTER
When the letter is signed by Dr. Sanchez please provide to Terri to fax to contact number and contact pt that letter is completed and send her a copy as well.

## 2024-05-02 NOTE — TELEPHONE ENCOUNTER
At 12:43 p.m. left  msg for patient indicating a new [id number] note was written and signed by Dr. Sanchez and faxed to [employer] at [phone] at 10:05 a.m. this morning. Also, stated staff would email a copy to her to the email listed on file: [email or web address]    Left office contact information if patient has further questions or concerns.  Confirmation of note faxed uploaded to patient chart.    No further clinical actions required at this time.

## 2024-05-06 ENCOUNTER — OFFICE VISIT (OUTPATIENT)
Dept: SURGERY | Age: 54
End: 2024-05-06

## 2024-05-06 DIAGNOSIS — Z51.89 VISIT FOR WOUND CHECK: ICD-10-CM

## 2024-05-06 DIAGNOSIS — Z48.02 VISIT FOR SUTURE REMOVAL: Primary | ICD-10-CM

## 2024-05-06 PROCEDURE — 99024 POSTOP FOLLOW-UP VISIT: CPT | Performed by: DERMATOLOGY

## 2024-05-06 NOTE — PROGRESS NOTES
S:  The patient is here for suture removal s/p Mohs surgery on the right nasal sidewall/ala and Unilateral Advancement Flap with FTSG repair, 1 week(s) ago. The site appears well-healed without signs of infection (redness, pain or discharge). The sutures were removed. Looks great.  Wound care and activity instructions given.  The patient was scheduled for follow-up in 4 weeks for scar/wound check.  The patient was scheduled for f/u with General Dermatology per their instructions.     S: The patient is here for wound check s/p Mohs surgery on the right lower leg with second intent wound healing, 3 week(s) ago.  The patient states pain is sig improved.  O:  The wound has moderate fibrin.  No erythema/purulence/pain.      A/P:  Chronic problem: is not at treatment goal:  open wound of the right lower leg s/p Mohs surgery.  Status:  The wound is healing well by second intention.  No s/sx infection/bleeding. Fibrin debrided with forceps    The area was cleansed with a gentle cleanser, a small amount of Aquaphor and a non-stick dressing applied.   Detailed second intention wound care instructions reviewed with the patient including daily gentle cleansing with mild cleanser such as cetaphil, application of topical petrolatum or aquaphor and wound coverage. Reminder to remove excessive fibrin as necessary, best after cleansing when fibrin is less adherent.  Pt education on how to perform this.  Healing time discussed.  The patient is scheduled for f/u wound check in 4 week(s).    OTC Meds:  cont aquaphor and cover  Prescription Meds:  no  Co-morbid conditions affecting healing (I.e. tobacco, diabetes, pvd, peripheral edema, immunosuppression):  no

## 2024-06-03 ENCOUNTER — TELEPHONE (OUTPATIENT)
Dept: SURGERY | Age: 54
End: 2024-06-03

## 2024-06-03 NOTE — TELEPHONE ENCOUNTER
Pt called cancelling appt today due to having \"Step\".  She's been r/s for 6/17 at 11:30 a.m. for her wound/scar check and she's sending photos of site to Mohs account (please check) and would like a call back to confirm how she should care for the area while out sick.

## 2024-06-03 NOTE — TELEPHONE ENCOUNTER
Left message explaining Dr. Sanchez viewed photo and stated area looked fine and to continue doing daily wound care that currently doing until follow up appointment.

## 2024-09-03 ENCOUNTER — TELEPHONE (OUTPATIENT)
Dept: SURGERY | Age: 54
End: 2024-09-03

## 2024-09-03 NOTE — TELEPHONE ENCOUNTER
Patient no showed for 7/25/24 appt. I called patient lvm to call us back regarding rescheduling.  Site # [phone]

## 2025-04-29 NOTE — PATIENT INSTRUCTIONS
Called pt to see if he needs a RTW visit with [facility] or an [facility], Left VM    [facility] Office Hours:    Monday-Thursday  7:30 AM-4:30 PM    Friday  9:00 AM-1:00 PM    DAILY WOUND CARE-SECOND INTENTION - LEG    1.  Keep the area absolutely dry for 24 to 48 hours.          -After 24 to 48 hours you may remove the bandage and shower.  2.  Remove the bandage and clean the wound with mild soap and water. Try to clean off crust and debris.            It is important not to allow a scab to form as scabs slow down the healing process.   3. Soak with a diluted vinegar solution of 1 part white table vinegar to 4 parts water for 5 minutes daily to help disinfect the area.    4.  Apply a layer of Vaseline (or Bacitracin if your doctor recommends) to the wound area only.  5.  Cut a piece of Non-stick dressing, such as Telfa, to fit just over the wound and secure it with paper tape. If the wound is small you may use a Band-Aid  6.  Elevate your legs whenever you are in a seated position for more than 15 minutes.  Wear compression stockings or support hose if possible to reduce swelling.    You should continue daily wound care and keep a bandage on until new skin has grown over the entire wound    Bleeding: If bleeding occurs, DO NOT remove the bandage. Put firm pressure on the area with gauze for 20 minutes without peeking. If the bleeding continues, apply pressure for 20 minutes more.  If the bleeding does not stop after you apply pressure, call us right away. If you can’t call, go to the nearest emergency room or urgent care facility.    POST-OPERATIVE INSTRUCTIONS     1. Activity: Do not lift anything heavier than a gallon of milk for 1 week. Also, avoid strenuous activity such as running, power walking or contact sports.  2.  Eating and drinking: Do not drink alcohol for 48 hours after your procedure. Alcohol increases the chances of bleeding.  3.  Medicines:  -If you have discomfort, take acetaminophen (Tylenol or Extra Strength Tylenol). Follow the instructions and warning on